# Patient Record
Sex: MALE | Race: OTHER | Employment: OTHER | URBAN - METROPOLITAN AREA
[De-identification: names, ages, dates, MRNs, and addresses within clinical notes are randomized per-mention and may not be internally consistent; named-entity substitution may affect disease eponyms.]

---

## 2017-05-17 ENCOUNTER — OFFICE VISIT (OUTPATIENT)
Dept: INTERNAL MEDICINE CLINIC | Age: 52
End: 2017-05-17

## 2017-05-17 VITALS
RESPIRATION RATE: 14 BRPM | BODY MASS INDEX: 26.82 KG/M2 | HEIGHT: 72 IN | SYSTOLIC BLOOD PRESSURE: 125 MMHG | HEART RATE: 76 BPM | DIASTOLIC BLOOD PRESSURE: 76 MMHG | TEMPERATURE: 98.3 F | WEIGHT: 198 LBS

## 2017-05-17 DIAGNOSIS — M65.331 TRIGGER MIDDLE FINGER OF RIGHT HAND: ICD-10-CM

## 2017-05-17 DIAGNOSIS — M25.562 ACUTE PAIN OF LEFT KNEE: ICD-10-CM

## 2017-05-17 DIAGNOSIS — M65.312 TRIGGER FINGER OF LEFT THUMB: Primary | ICD-10-CM

## 2017-05-17 RX ORDER — TRIAMCINOLONE ACETONIDE 40 MG/ML
40 INJECTION, SUSPENSION INTRA-ARTICULAR; INTRAMUSCULAR ONCE
Qty: 1 ML | Refills: 0
Start: 2017-05-17 | End: 2017-05-17

## 2017-05-17 NOTE — PROGRESS NOTES
Chief Complaint   Patient presents with    Head Pain     finger     he is a 54y.o. year old male who presents for evaluation of right middle finger trigger finger  Pain Assessment Encounter      Heidi Daly  5/17/2017  Onset of Symptoms: over a year  ________________________________________________________________________  Description:\"sticks\" and aches    Frequency: more than 5 times a day  Pain Scale:(1-10): 5  Trauma Hx: none  Hx of similar symptoms: Yes  Radiation: no  Duration:  continuous      Progression: is unchanged  What makes it better?: rest  What makes it worse?:using his hand  Medications tried: none    Reviewed and agree with Nurse Note and duplicated in this note. Reviewed PmHx, RxHx, FmHx, SocHx, AllgHx and updated and dated in the chart. Family History   Problem Relation Age of Onset    Hypertension Mother     Hypertension Father     Stroke Father     Diabetes Father     Elevated Lipids Father        Past Medical History:   Diagnosis Date    Diabetes ([facility] [address]     High cholesterol     Hypertension       Social History     Social History    Marital status: SINGLE     Spouse name: N/A    Number of children: N/A    Years of education: N/A     Social History Main Topics    Smoking status: Current Every Day Smoker    Smokeless tobacco: Not on file    Alcohol use No    Drug use: Not on file    Sexual activity: Not on file     Other Topics Concern    Not on file     Social History Narrative    Family from [address] [place] [address] - negative except as listed above  {ros master:129103}    Objective:     Vitals:    05/17/17 0911   BP: (!) 160/103   Pulse: 76   Resp: 14   Temp: 98.3 °F (36.8 °C)   Weight: 198 lb (89.8 kg)   Height: 6' (1.829 m)       Physical Examination: {male adult master:386835}    Assessment/ Plan:   There are no diagnoses linked to this encounter.      {plan:50992}           I have discussed the diagnosis with the patient and the intended plan as seen in the above orders. The patient has received an after-visit summary and questions were answered concerning future plans. Medication Side Effects and Warnings were discussed with patient: {yes/ no default yes:50560::\"yes\"}  Patient Labs were reviewed and or requested: {yes/ no default yes:36396::\"yes\"}  Patient Past Records were reviewed and or requested  {yes/ no default yes:15536::\"yes\"}  I have discussed the diagnosis with the patient and the intended plan as seen in the above orders. The patient has received an after-visit summary and questions were answered concerning future plans. Pt agrees to call or return to clinic and/or go to closest ER with any worsening of symptoms. This may include, but not limited to increased fever (>100.4) with NSAIDS or Tylenol, increased edema, confusion, rash, worsening of presenting symptoms. 1) Remember to stay active and/or exercise regularly (I suggest 30-45 minutes daily)   2) For reliable dietary information, go to www. EATRIGHT.org. You may wish to consider seeing the nutritionist at [facility] at #[phone] or [phone], also consider the [address]   3) I routinely suggest a complete physical exam once each year (your birth month)

## 2017-05-17 NOTE — MR AVS SNAPSHOT
Visit Information Date & Time Provider Department Dept. Phone Encounter #  
 5/17/2017 10:45 AM [address] [facility] MD Jamila [facility] and [place] [address] [id number] Follow-up Instructions Return in about 4 weeks (around 6/14/2017), or if symptoms worsen or fail to improve. Your Appointments 5/31/2017  9:00 AM  
Any with Anali Rizvi MD  
[address] [place] [address] and Primary Care [facility] Appt Note: overdue for 3 mo f/up; r/s from 5/17  
 . [address] [facility] [address] [id number] Upcoming Health Maintenance Date Due HEMOGLOBIN A1C Q6M 1967 LIPID PANEL Q1 1967 FOOT EXAM Q1 7/25/1977 MICROALBUMIN Q1 7/25/1977 EYE EXAM RETINAL OR DILATED Q1 7/25/1977 Pneumococcal 19-64 Medium Risk (1 of 1 - PPSV23) 7/25/1986 DTaP/Tdap/Td series (1 - Tdap) 7/25/1988 INFLUENZA AGE 9 TO ADULT 8/1/2017 Allergies as of 5/17/2017  Review Complete On: 5/17/2017 By: [address] [facility] MD Jamila  
 No Known Allergies Current Immunizations  Never Reviewed No immunizations on file. Not reviewed this visit You Were Diagnosed With   
  
 Codes Comments Trigger finger of left thumb    -  Primary ICD-10-CM: G22.076 ICD-9-CM: 727.03 Acute pain of left knee     ICD-10-CM: M25.562 ICD-9-CM: 719.46 Trigger middle finger of right hand     ICD-10-CM: M65.331 ICD-9-CM: 727.03 Vitals BP Pulse Temp Resp Height(growth percentile) Weight(growth percentile) 125/76 76 98.3 °F (36.8 °C) 14 6' (1.829 m) 198 lb (89.8 kg) BMI Smoking Status 26.85 kg/m2 Current Every Day Smoker Vitals History BMI and BSA Data Body Mass Index Body Surface Area  
 26.85 kg/m 2 2.14 m 2 Preferred Pharmacy Pharmacy Name Phone [facility] [address] [place] [address] [phone] Your Updated Medication List  
 This list is accurate as of: 5/17/17 11:08 AM.  Always use your most recent med list.  
  
  
  
  
 atorvastatin 20 mg tablet Commonly known as:  LIPITOR Take 1 Tab by mouth daily. ibuprofen 600 mg tablet Commonly known as:  MOTRIN Take 1 Tab by mouth every six (6) hours as needed for Pain. lisinopril 40 mg tablet Commonly known as:  Gunjan Mario Alberto Take 1 Tab by mouth daily. metFORMIN 500 mg tablet Commonly known as:  GLUCOPHAGE Take 1 Tab by mouth two (2) times daily (with meals). triamcinolone acetonide 40 mg/mL injection Commonly known as:  KENALOG  
1 mL by IntraMUSCular route once for 1 dose. We Performed the Following [place] INJECT TENDON SHEATH/LIGAMENT [id number] CPT(R)] TRIAMCINOLONE ACETONIDE INJ [ [facility]] Follow-up Instructions Return in about 4 weeks (around 6/14/2017), or if symptoms worsen or fail to improve. To-Do List   
 05/17/2017 Imaging:  XR KNEE LT MIN 4 V   
  
 05/17/2017 Imaging:  XR THUMB LT MIN 2 V Introducing [facility]ALTH SERVICES! [facility] introduces [employer] patient portal. Now you can access parts of your medical record, email your doctor's office, and request medication refills online. 1. In your internet browser, go to [email or web address] 2. Click on the First Time User? Click Here link in the Sign In box. You will see the New Member Sign Up page. 3. Enter your [employer] Access Code exactly as it appears below. You will not need to use this code after youve completed the sign-up process. If you do not sign up before the expiration date, you must request a new code. · [employer] Access Code: [email or web address] Expires: 8/15/2017  9:32 AM 
 
4. Enter the last four digits of your Social Security Number (xxxx) and Date of Birth (mm/dd/yyyy) as indicated and click Submit. You will be taken to the next sign-up page. 5. Create a [employer] ID. This will be your [employer] login ID and cannot be changed, so think of one that is secure and easy to remember. 6. Create a [employer] password. You can change your password at any time. 7. Enter your Password Reset Question and Answer. This can be used at a later time if you forget your password. 8. Enter your e-mail address. You will receive e-mail notification when new information is available in [address] 9. Click Sign Up. You can now view and download portions of your medical record. 10. Click the Download Summary menu link to download a portable copy of your medical information. If you have questions, please visit the Frequently Asked Questions section of the [employer] website. Remember, [employer] is NOT to be used for urgent needs. For medical emergencies, dial 911. Now available from your iPhone and Android! Please provide this summary of care documentation to your next provider. Your primary care clinician is listed as Zehra Christianson. If you have any questions after today's visit, please call [phone].

## 2017-05-17 NOTE — PROGRESS NOTES
Chief Complaint   Patient presents with    Head Pain     finger     he is a 54y.o. year old male who presents for evaluation of right middle finger trigger finger  Pain Assessment Encounter      Christopher Montiel  5/17/2017  Onset of Symptoms: over a year  ________________________________________________________________________  Description:\"sticks\" and aches    Frequency: more than 5 times a day  Pain Scale:(1-10): 5  Trauma Hx: none  Hx of similar symptoms: Yes  Radiation: no  Duration:  continuous      Progression: is unchanged  What makes it better?: rest  What makes it worse?:using his hand  Medications tried: none    Reviewed and agree with Nurse Note and duplicated in this note. Reviewed PmHx, RxHx, FmHx, SocHx, AllgHx and updated and dated in the chart.     Family History   Problem Relation Age of Onset    Hypertension Mother     Hypertension Father     Stroke Father     Diabetes Father     Elevated Lipids Father        Past Medical History:   Diagnosis Date    Diabetes ([facility] [address]     High cholesterol     Hypertension       Social History     Social History    Marital status: SINGLE     Spouse name: N/A    Number of children: N/A    Years of education: N/A     Social History Main Topics    Smoking status: Current Every Day Smoker    Smokeless tobacco: Not on file    Alcohol use No    Drug use: Not on file    Sexual activity: Not on file     Other Topics Concern    Not on file     Social History Narrative    Family from [address] [place] [address] - negative except as listed above      Objective:     Vitals:    05/17/17 0911   BP: 125/76   Pulse: 76   Resp: 14   Temp: 98.3 °F (36.8 °C)   Weight: 198 lb (89.8 kg)   Height: 6' (1.829 m)       Physical Examination: General appearance - alert, well appearing, and in no distress  Back exam - full range of motion, no tenderness, palpable spasm or pain on motion  Neurological - alert, oriented, normal speech, no focal findings or movement disorder noted  Musculoskeletal -  right finger 3rd MCP - clicking and locking with movement, left thumb pain with palpation over flexor tendon with locking  left knee exam:  The patient'sleft Knee  is  normal to inspection. The ROM is normal and there is flexion to 60 Effusion is: absent The joint exhibits  absent warmth and Crepitus is: absent. The Jayme test is:  Negative Joint Line Tenderness is  Negative . The [facility] Test is Negative   and the Lachman is  negative   . The Anterior Drawer is  Negative. The Posterior Drawer is:   negative. Valgus Stress (for MCL) is:  normal . Varus Stress (for LCL) is  normal  . The Roderick Test is negative and the Apprehension Sign:   negative    Extremities - peripheral pulses normal, no pedal edema, no clubbing or cyanosis  Skin - normal coloration and turgor, no rashes, no suspicious skin lesions noted  Time Out taken at:  9:58 AM  5/17/2017    * Patient was identified by name and date of birth   * Agreement on procedure being performed was verified  * Risks and Benefits explained to the patient  * Procedure site verified and marked as necessary  * Patient was positioned for comfort  * Consent was signed and verified   In the presence of: Witness: JORDI Jacobs  Injection #: 1  Needle:  22 gauge  Procedure: This procedure was discussed with Estephanie Ruffin and other therapeutic options were considered (risks vs benefits). Estephanie Ruffin and I thought that an injection was merited. After informed consent was obtained, landmarks were identified(marked), and the right trigger finger - 3rd  was cleansed with ChlorPrep in the standard sterile manner. .5 mL  1% lidocaine  and  .5 mL Kenalog  was then injected and needle tenotomy was not performed. Procedure performed with ultrasound needle guidance. The needle was then withdrawn. T he procedure was well tolerated. The patient is asked to continue to rest the area for a few more days before resuming regular activities. It may be more painful for the first 1-2 days. NSAIDS are to be avoided. Watch for fever, or increased swelling or persistent pain in the joint. Call or return to clinic prn if such symptoms occur or there is failure to improve as anticipated. The procedure did provide relief of symptoms in the clinic. RTC in 4 weeks for reevaluation and possible reinjection. Given the patient's body habitus and the anatomically deep nature of this structure, sonographic guidance is recommended to prevent injury to neurovascular structures and confirm accuracy of injection. Furthermore, this patient has failed conservative treatment with physical therapy and modalities and the diagnostic and therapeutic accuracy is important. Assessment/ Plan:   Stella Sandoval was seen today for head pain. Diagnoses and all orders for this visit:    Trigger finger of left thumb  -     XR THUMB LT MIN 2 V; Future  -     TRIAMCINOLONE ACETONIDE INJ  -     triamcinolone acetonide (KENALOG) 40 mg/mL injection; 1 mL by IntraMUSCular route once for 1 dose. -     20550 - INJECT TENDON SHEATH/LIGAMENT    Acute pain of left knee  -     XR KNEE LT MIN 4 V; Future    Trigger middle finger of right hand         Pathophysiology, recovery and rehabilitation process discussed and questions answered   Counseling for 30 Minutes of the total visit duration   Pictures and figures used as necessary   Provided reassurance   Monitor response to Physical Therapy   Recommend  lower impact activities-walking, Eliptical, Nordic Track, cycling or swimming   Follow up in 4 week(s)           I have discussed the diagnosis with the patient and the intended plan as seen in the above orders. The patient has received an after-visit summary and questions were answered concerning future plans.      Medication Side Effects and Warnings were discussed with patient: yes  Patient Labs were reviewed and or requested: yes  Patient Past Records were reviewed and or requested yes  I have discussed the diagnosis with the patient and the intended plan as seen in the above orders. The patient has received an after-visit summary and questions were answered concerning future plans. Pt agrees to call or return to clinic and/or go to closest ER with any worsening of symptoms. This may include, but not limited to increased fever (>100.4) with NSAIDS or Tylenol, increased edema, confusion, rash, worsening of presenting symptoms. 1) Remember to stay active and/or exercise regularly (I suggest 30-45 minutes daily)   2) For reliable dietary information, go to www. EATRIGHT.org. You may wish to consider seeing the nutritionist at [facility] at #[phone] or [phone], also consider the [address] [facility].   3) I routinely suggest a complete physical exam once each year (your birth month)

## 2017-05-26 ENCOUNTER — APPOINTMENT (OUTPATIENT)
Dept: PHYSICAL THERAPY | Age: 52
End: 2017-05-26

## 2017-08-15 RX ORDER — METFORMIN HYDROCHLORIDE 500 MG/1
500 TABLET ORAL 2 TIMES DAILY WITH MEALS
Qty: 180 TAB | Refills: 3 | Status: SHIPPED | OUTPATIENT
Start: 2017-08-15 | End: 2017-08-17 | Stop reason: SDUPTHER

## 2017-08-17 ENCOUNTER — OFFICE VISIT (OUTPATIENT)
Dept: INTERNAL MEDICINE CLINIC | Age: 52
End: 2017-08-17

## 2017-08-17 VITALS
SYSTOLIC BLOOD PRESSURE: 151 MMHG | HEIGHT: 72 IN | DIASTOLIC BLOOD PRESSURE: 88 MMHG | BODY MASS INDEX: 27.37 KG/M2 | OXYGEN SATURATION: 96 % | HEART RATE: 73 BPM | WEIGHT: 202.1 LBS | RESPIRATION RATE: 18 BRPM | TEMPERATURE: 98.2 F

## 2017-08-17 DIAGNOSIS — I10 ESSENTIAL HYPERTENSION: ICD-10-CM

## 2017-08-17 DIAGNOSIS — B35.3 TINEA PEDIS OF BOTH FEET: ICD-10-CM

## 2017-08-17 DIAGNOSIS — Z12.11 SCREEN FOR COLON CANCER: ICD-10-CM

## 2017-08-17 DIAGNOSIS — E11.8 TYPE 2 DIABETES MELLITUS WITH COMPLICATION, WITHOUT LONG-TERM CURRENT USE OF INSULIN (HCC): Primary | ICD-10-CM

## 2017-08-17 DIAGNOSIS — M21.611 BUNION OF GREAT TOE OF RIGHT FOOT: ICD-10-CM

## 2017-08-17 DIAGNOSIS — E78.5 HYPERLIPIDEMIA, UNSPECIFIED HYPERLIPIDEMIA TYPE: ICD-10-CM

## 2017-08-17 RX ORDER — LISINOPRIL 40 MG/1
40 TABLET ORAL DAILY
Qty: 90 TAB | Refills: 4 | Status: SHIPPED | OUTPATIENT
Start: 2017-08-17 | End: 2021-06-30 | Stop reason: SDUPTHER

## 2017-08-17 RX ORDER — METFORMIN HYDROCHLORIDE 500 MG/1
500 TABLET ORAL 2 TIMES DAILY WITH MEALS
Qty: 180 TAB | Refills: 3 | Status: SHIPPED | OUTPATIENT
Start: 2017-08-17 | End: 2021-06-30 | Stop reason: SDUPTHER

## 2017-08-17 RX ORDER — ATORVASTATIN CALCIUM 20 MG/1
TABLET, FILM COATED ORAL
Qty: 90 TAB | Refills: 4 | Status: SHIPPED | OUTPATIENT
Start: 2017-08-17 | End: 2021-06-30 | Stop reason: SDUPTHER

## 2017-08-17 RX ORDER — ECONAZOLE NITRATE 10 MG/G
CREAM TOPICAL 2 TIMES DAILY
Qty: 85 G | Refills: 4 | Status: SHIPPED | OUTPATIENT
Start: 2017-08-17 | End: 2021-06-30 | Stop reason: ALTCHOICE

## 2017-08-17 NOTE — MR AVS SNAPSHOT
Visit Information Date & Time Provider Department Dept. Phone Encounter #  
 8/17/2017  9:30 AM Shar Krishna MD [facility] Sports Medicine and [place] [address] [id number] Follow-up Instructions Return in about 6 months (around 2/17/2018) for Blood Pressure, Diabetes. Follow-up and Disposition History Your Appointments 8/18/2017  9:30 AM  
Any with [address] [place] [address] MD Jamila  
[address] [place] [address] and Primary Care [address] [place] [address]) Appt Note: left thumb pain [address]n [address] [facility] [place]  
  
   
 [address] [address] Upcoming Health Maintenance Date Due HEMOGLOBIN A1C Q6M 1965 LIPID PANEL Q1 1967 FOOT EXAM Q1 7/25/1977 MICROALBUMIN Q1 7/25/1977 EYE EXAM RETINAL OR DILATED Q1 7/25/1977 Pneumococcal 19-64 Medium Risk (1 of 1 - PPSV23) 7/25/1986 DTaP/Tdap/Td series (1 - Tdap) 7/25/1988 FOBT Q 1 YEAR AGE 50-75 7/25/2017 INFLUENZA AGE 9 TO ADULT 8/1/2017 Allergies as of 8/17/2017  Review Complete On: 8/17/2017 By: Travis Wheeler No Known Allergies Current Immunizations  Never Reviewed No immunizations on file. Not reviewed this visit You Were Diagnosed With   
  
 Codes Comments Type 2 diabetes mellitus with complication, without long-term current use of insulin (HCC)    -  Primary ICD-10-CM: E11.8 ICD-9-CM: 250.90 Hyperlipidemia, unspecified hyperlipidemia type     ICD-10-CM: E78.5 ICD-9-CM: 272.4 Essential hypertension     ICD-10-CM: I10 
ICD-9-CM: 401.9 Tinea pedis of both feet     ICD-10-CM: B35.3 ICD-9-CM: 110.4 Bunion of great toe of right foot     ICD-10-CM: M21.611 ICD-9-CM: 727.1 Screen for colon cancer     ICD-10-CM: Z12.11 ICD-9-CM: V76.51 Vitals BP Pulse Temp Resp Height(growth percentile) Weight(growth percentile)  151/88 (BP 1 Location: Left arm, BP Patient Position: Sitting) 73 98.2 °F (36.8 °C) (Oral) 18 6' (1.829 m) 202 lb 1.6 oz (91.7 kg) SpO2 BMI Smoking Status 96% 27.41 kg/m2 Current Every Day Smoker BMI and BSA Data Body Mass Index Body Surface Area  
 27.41 kg/m 2 2.16 m 2 [facility] [place] [facility] [phone] Your Updated Medication List  
  
   
This list is accurate as of: 8/17/17  2:44 PM.  Always use your most recent med list.  
  
  
  
  
 atorvastatin 20 mg tablet Commonly known as:  LIPITOR  
take 1 tablet by mouth once daily  
  
 econazole nitrate 1 % topical cream  
Commonly known as:  Glenis Escaleraten Apply  to affected area two (2) times a day. Entire foot and between toes  
  
 ibuprofen 600 mg tablet Commonly known as:  MOTRIN Take 1 Tab by mouth every six (6) hours as needed for Pain. lisinopril 40 mg tablet Commonly known as:  Bobby Littler Take 1 Tab by mouth daily. Overdue for visit. Make appointment in 1-2 weeks. metFORMIN 500 mg tablet Commonly known as:  GLUCOPHAGE Take 1 Tab by mouth two (2) times daily (with meals). Prescriptions Sent to Pharmacy Refills  
 metFORMIN (GLUCOPHAGE) 500 mg tablet 3 Sig: Take 1 Tab by mouth two (2) times daily (with meals). Class: Normal  
 Pharmacy: [address] Ph #: [phone] Route: Oral  
 atorvastatin (LIPITOR) 20 mg tablet 4 Sig: take 1 tablet by mouth once daily Class: Normal  
 Pharmacy: [facility] Ph #: [phone]  
 lisinopril (PRINIVIL, ZESTRIL) 40 mg tablet 4 Sig: Take 1 Tab by mouth daily. Overdue for visit. Make appointment in 1-2 weeks. Class: Normal  
 Pharmacy: [address] Ph #: [phone]  Route: Oral  
 econazole nitrate (SPECTAZOLE) 1 % topical cream 4  
 Sig: Apply  to affected area two (2) times a day. Entire foot and between toes Class: Normal  
 Pharmacy: Madhav , [address] Ph #: [phone] Route: Topical  
  
We Performed the Following HEMOGLOBIN A1C WITH EAG [57176 CPT(R)]  DIABETES FOOT EXAM [HM7 Custom] LIPID PANEL [69815 CPT(R)] METABOLIC PANEL, BASIC [53190 CPT(R)] MICROALBUMIN, UR, RAND W/ MICROALBUMIN/CREA RATIO G7260059 CPT(R)] CA COLLECTION VENOUS BLOOD,VENIPUNCTURE [id number] CPT(R)] REFERRAL FOR COLONOSCOPY [GAP912 Custom] Comments:  
 Please evaluate patient for screening colonoscopy. REFERRAL TO OPHTHALMOLOGY [REF57 Custom] Comments:  
 Please evaluate patient for DM eye exam, prescription update [employer] Follow-up Instructions Return in about 6 months (around 2/17/2018) for Blood Pressure, Diabetes. Referral Information Referral ID Referred By Referred To  
  
 [id number] [place] [employer] [address] [facility], [address] Visits Status Start Date End Date 1 Authorized 8/17/17 8/17/18 If your referral has a status of pending review or denied, additional information will be sent to support the outcome of this decision. Referral ID Referred By Referred To  
 [id number] [employer] [facility] [place] [address] Phone: [phone] Fax: [phone] Visits Status Start Date End Date 1 Authorized 8/17/17 8/17/18 If your referral has a status of pending review or denied, additional information will be sent to support the outcome of this decision. Introducing \[facility]\"" & HEALTH SERVICES! Magalys Martin introduces [employer] patient portal. Now you can access parts of your medical record, email your doctor's office, and request medication refills online.    
 
1. In your internet browser, go to [email or web address] 2. Click on the First Time User? Click Here link in the Sign In box. You will see the New Member Sign Up page. 3. Enter your [employer] Access Code exactly as it appears below. You will not need to use this code after youve completed the sign-up process. If you do not sign up before the expiration date, you must request a new code. · [employer] Access Code: [email or web address] Expires: 11/15/2017  2:44 PM 
 
4. Enter the last four digits of your Social Security Number (xxxx) and Date of Birth (mm/dd/yyyy) as indicated and click Submit. You will be taken to the next sign-up page. 5. Create a [employer] Access [employer]t ID. This will be your [employer] login ID and cannot be changed, so think of one that is secure and easy to remember. 6. Create a [employer] password. You can change your password at any time. 7. Enter your Password Reset Question and Answer. This can be used at a later time if you forget your password. 8. Enter your e-mail address. You will receive e-mail notification when new information is available in [address] 9. Click Sign Up. You can now view and download portions of your medical record. 10. Click the Download Summary menu link to download a portable copy of your medical information. If you have questions, please visit the Frequently Asked Questions section of the [employer] website. Remember, [employer] is NOT to be used for urgent needs. For medical emergencies, dial 911. Now available from your iPhone and Android! Please provide this summary of care documentation to your next provider. Your primary care clinician is listed as Jose F Fong. If you have any questions after today's visit, please call [phone].

## 2017-08-17 NOTE — PROGRESS NOTES
Mr. Emmie June is a 50y.o. year old male who had concerns including Diabetes; Hypertension; and Medication Refill. HPI:  Chief Complaint   Patient presents with    Diabetes     Foot Exam- Rt Great Toe    Hypertension    Medication Refill     Out of Meds X 1 Mo. Non compliant with diet- high rice intake, [place]. No checking BS  Past Medical History:   Diagnosis Date    Diabetes ([address]     High cholesterol     Hypertension      Current Outpatient Prescriptions   Medication Sig Dispense    metFORMIN (GLUCOPHAGE) 500 mg tablet Take 1 Tab by mouth two (2) times daily (with meals). 180 Tab    atorvastatin (LIPITOR) 20 mg tablet take 1 tablet by mouth once daily 90 Tab    lisinopril (PRINIVIL, ZESTRIL) 40 mg tablet Take 1 Tab by mouth daily. Overdue for visit. Make appointment in 1-2 weeks. 90 Tab    econazole nitrate (SPECTAZOLE) 1 % topical cream Apply  to affected area two (2) times a day. Entire foot and between toes 85 g    ibuprofen (MOTRIN) 600 mg tablet Take 1 Tab by mouth every six (6) hours as needed for Pain. 20 Tab     No current facility-administered medications for this visit. Reviewed PmHx, RxHx, FmHx, SocHx, AllgHx and updated and dated in the chart. ROS: Negative except for BOLD  General: fever, chills, fatigue  Respiratory: cough, SOB, wheezing  Cardiovascular:  CP, palpitation, YO, edema   Gastrointestinal: N/V/D, bleeding  Genito-Urinary: dysuria, hematuria  Musculoskeletal: muscle weakness, pain, swelling    OBJECTIVE:   Visit Vitals    /88 (BP 1 Location: Left arm, BP Patient Position: Sitting)    Pulse 73    Temp 98.2 °F (36.8 °C) (Oral)    Resp 18    Ht 6' (1.829 m)    Wt 202 lb 1.6 oz (91.7 kg)    SpO2 96%    BMI 27.41 kg/m2     GEN: The patient appears well, alert, oriented x 3, in no distress. ENT: bilateral TM and canal normal.  Neck supple. No adenopathy or thyromegaly. VICENTA.    Lungs: clear bilaterally, good air entry, no wheezes, rhonchi or rales. Cardiovascular: regular rate and rhythm. S1 and S2 normal, no murmurs,  Abdomen: + BS, soft without tenderness, guarding, rebound, mass or organomegaly. Extremities: no edema, normal peripheral pulses. Neurological: normal, gross sensory and motor in tact without focal findings. Bilateral foot scaling,maceration. Tender bunion on right great toe. Assessment/ Plan:       ICD-10-CM ICD-9-CM    1. Type 2 diabetes mellitus with complication, without long-term current use of insulin (HCC) E11.8 250.90 HEMOGLOBIN A1C WITH EAG      METABOLIC PANEL, BASIC      MICROALBUMIN, UR, RAND W/ MICROALBUMIN/CREA RATIO      MD COLLECTION VENOUS BLOOD,VENIPUNCTURE      LIPID PANEL      HM DIABETES FOOT EXAM      metFORMIN (GLUCOPHAGE) 500 mg tablet      atorvastatin (LIPITOR) 20 mg tablet      lisinopril (PRINIVIL, ZESTRIL) 40 mg tablet   2. Hyperlipidemia, unspecified hyperlipidemia type E78.5 272.4 atorvastatin (LIPITOR) 20 mg tablet   3. Essential hypertension I10 401.9 lisinopril (PRINIVIL, ZESTRIL) 40 mg tablet   4. Tinea pedis of both feet B35.3 110.4 econazole nitrate (SPECTAZOLE) 1 % topical cream   5. Bunion of great toe of right foot M21.611 727.1        Pt out of medication x 1 month. Refills and labs today. No prior A1c for comparison, pt not checking blood sugars. I have discussed the diagnosis with the patient and the intended plan as seen in the above orders. The patient has received an after-visit summary and questions were answered concerning future plans. Medication Side Effects and Warnings were discussed with patient. Follow-up Disposition:  Return in about 6 months (around 2/17/2018) for Blood Pressure, Diabetes.       Cheryl Jackson MD

## 2017-08-17 NOTE — PROGRESS NOTES
Chief Complaint   Patient presents with    Diabetes     Foot Exam- Rt Great Toe    Hypertension    Medication Refill     Out of Meds X 1 Mo. Declines Immunizations.

## 2017-08-18 ENCOUNTER — OFFICE VISIT (OUTPATIENT)
Dept: INTERNAL MEDICINE CLINIC | Age: 52
End: 2017-08-18

## 2017-08-18 VITALS
WEIGHT: 202 LBS | SYSTOLIC BLOOD PRESSURE: 164 MMHG | RESPIRATION RATE: 16 BRPM | DIASTOLIC BLOOD PRESSURE: 110 MMHG | BODY MASS INDEX: 27.36 KG/M2 | HEIGHT: 72 IN | HEART RATE: 68 BPM

## 2017-08-18 DIAGNOSIS — M65.312 TRIGGER THUMB, LEFT THUMB: Primary | ICD-10-CM

## 2017-08-18 LAB
ALBUMIN/CREAT UR: 9.6 MG/G CREAT (ref 0–30)
BUN SERPL-MCNC: 9 MG/DL (ref 6–24)
BUN/CREAT SERPL: 12 (ref 9–20)
CALCIUM SERPL-MCNC: 9.6 MG/DL (ref 8.7–10.2)
CHLORIDE SERPL-SCNC: 99 MMOL/L (ref 96–106)
CHOLEST SERPL-MCNC: 269 MG/DL (ref 100–199)
CO2 SERPL-SCNC: 22 MMOL/L (ref 18–29)
CREAT SERPL-MCNC: 0.77 MG/DL (ref 0.76–1.27)
CREAT UR-MCNC: 203.3 MG/DL
EST. AVERAGE GLUCOSE BLD GHB EST-MCNC: 148 MG/DL
GLUCOSE SERPL-MCNC: 136 MG/DL (ref 65–99)
HBA1C MFR BLD: 6.8 % (ref 4.8–5.6)
HDLC SERPL-MCNC: 87 MG/DL
LDLC SERPL CALC-MCNC: 161 MG/DL (ref 0–99)
MICROALBUMIN UR-MCNC: 19.6 UG/ML
POTASSIUM SERPL-SCNC: 5 MMOL/L (ref 3.5–5.2)
SODIUM SERPL-SCNC: 140 MMOL/L (ref 134–144)
TRIGL SERPL-MCNC: 104 MG/DL (ref 0–149)
VLDLC SERPL CALC-MCNC: 21 MG/DL (ref 5–40)

## 2017-08-18 RX ORDER — TRIAMCINOLONE ACETONIDE 40 MG/ML
40 INJECTION, SUSPENSION INTRA-ARTICULAR; INTRAMUSCULAR ONCE
Qty: 1 ML | Refills: 0
Start: 2017-08-18 | End: 2017-08-18

## 2017-08-18 NOTE — PATIENT INSTRUCTIONS
The patient is asked to continue to rest the area for a few more days before resuming regular activities. It may be more painful for the first 1-2 days. NSAIDS are to be avoided. Watch for fever, or increased swelling or persistent pain in the joint. Call or return to clinic prn if such symptoms occur or there is failure to improve as anticipated.

## 2017-08-18 NOTE — MR AVS SNAPSHOT
Visit Information Date & Time Provider Department Dept. Phone Encounter #  
 8/18/2017  9:30 AM Reynold Foreman MD [facility] [id number] Follow-up Instructions Return if symptoms worsen or fail to improve. Follow-up and Disposition History Upcoming Health Maintenance Date Due HEMOGLOBIN A1C Q6M 1967 LIPID PANEL Q1 1967 MICROALBUMIN Q1 7/25/1977 EYE EXAM RETINAL OR DILATED Q1 7/25/1977 Pneumococcal 19-64 Medium Risk (1 of 1 - PPSV23) 7/25/1986 DTaP/Tdap/Td series (1 - Tdap) 7/25/1988 FOBT Q 1 YEAR AGE 50-75 7/25/2017 INFLUENZA AGE 9 TO ADULT 8/1/2017 FOOT EXAM Q1 8/17/2018 Allergies as of 8/18/2017  Review Complete On: 8/18/2017 By: Reynold Foreman MD  
 No Known Allergies Current Immunizations  Never Reviewed No immunizations on file. Not reviewed this visit You Were Diagnosed With   
  
 Codes Comments Trigger thumb, left thumb    -  Primary ICD-10-CM: X03.610 ICD-9-CM: 727.03 Vitals BP Pulse Resp Height(growth percentile) Weight(growth percentile) BMI  
 (!) 164/110 68 16 6' (1.829 m) 202 lb (91.6 kg) 27.4 kg/m2 Smoking Status Current Every Day Smoker Vitals History BMI and BSA Data Body Mass Index Body Surface Area  
 27.4 kg/m 2 2.16 m 2 Preferred Pharmacy Pharmacy Name Phone [address] [facility] [phone] Your Updated Medication List  
  
   
This list is accurate as of: 8/18/17  1:32 PM.  Always use your most recent med list.  
  
  
  
  
 atorvastatin 20 mg tablet Commonly known as:  LIPITOR  
take 1 tablet by mouth once daily  
  
 econazole nitrate 1 % topical cream  
Commonly known as:  Edilia Agent Apply  to affected area two (2) times a day. Entire foot and between toes  
  
 ibuprofen 600 mg tablet Commonly known as:  MOTRIN  
 Take 1 Tab by mouth every six (6) hours as needed for Pain. lisinopril 40 mg tablet Commonly known as:  Guadalupe Naif Take 1 Tab by mouth daily. Overdue for visit. Make appointment in 1-2 weeks. metFORMIN 500 mg tablet Commonly known as:  GLUCOPHAGE Take 1 Tab by mouth two (2) times daily (with meals). triamcinolone acetonide 40 mg/mL injection Commonly known as:  KENALOG  
1 mL by IntraMUSCular route once for 1 dose. We Performed the Following HI INJECT TENDON SHEATH/LIGAMENT [id number] CPT(R)] TRIAMCINOLONE ACETONIDE INJ [ [facility]] Follow-up Instructions Return if symptoms worsen or fail to improve. Patient Instructions The patient is asked to continue to rest the area for a few more days before resuming regular activities. It may be more painful for the first 1-2 days. NSAIDS are to be avoided. Watch for fever, or increased swelling or persistent pain in the joint. Call or return to clinic prn if such symptoms occur or there is failure to improve as anticipated. Introducing \[facility]\"" & HEALTH SERVICES! Almas Maza introduces [employer] patient portal. Now you can access parts of your medical record, email your doctor's office, and request medication refills online. 1. In your internet browser, go to [email or web address] 2. Click on the First Time User? Click Here link in the Sign In box. You will see the New Member Sign Up page. 3. Enter your [employer] Access Code exactly as it appears below. You will not need to use this code after youve completed the sign-up process. If you do not sign up before the expiration date, you must request a new code. · [employer] Access Code: [email or web address] Expires: 11/15/2017  2:44 PM 
 
4. Enter the last four digits of your Social Security Number (xxxx) and Date of Birth (mm/dd/yyyy) as indicated and click Submit. You will be taken to the next sign-up page. 5. Create a [employer] ID. This will be your [employer] login ID and cannot be changed, so think of one that is secure and easy to remember. 6. Create a [employer] password. You can change your password at any time. 7. Enter your Password Reset Question and Answer. This can be used at a later time if you forget your password. 8. Enter your e-mail address. You will receive e-mail notification when new information is available in [address] 9. Click Sign Up. You can now view and download portions of your medical record. 10. Click the Download Summary menu link to download a portable copy of your medical information. If you have questions, please visit the Frequently Asked Questions section of the [employer] website. Remember, [employer] is NOT to be used for urgent needs. For medical emergencies, dial 911. Now available from your iPhone and Android! Please provide this summary of care documentation to your next provider. Your primary care clinician is listed as [employer] If you have any questions after today's visit, please call [phone].

## 2017-08-18 NOTE — PROGRESS NOTES
Chief Complaint   Patient presents with    Finger Pain     Left Hand Thumb Pain      he is a 50y.o. year old male who presents for evaluation of Left hand thumb pain   Pain Assessment Encounter      Neo Olmstead  8/18/2017  Onset of Symptoms: May 2017   ________________________________________________________________________  Description: \"just continuous pain\"    Frequency: more than 5 times a day, worst at night  Pain Scale:(1-10): 7  Trauma Hx: shut in the car door  Hx of similar symptoms: No  Radiation: No Radiation   Duration:  continuous      Progression: has moderately improved  What makes it better?: None   What makes it worse?:None   Medications tried: No OTC Medication Use     Reviewed and agree with Nurse Note and duplicated in this note. Reviewed PmHx, RxHx, FmHx, SocHx, AllgHx and updated and dated in the chart.     Family History   Problem Relation Age of Onset    Hypertension Mother     Hypertension Father     Stroke Father     Diabetes Father     Elevated Lipids Father        Past Medical History:   Diagnosis Date    [address] [address]     High cholesterol     Hypertension       Social History     Social History    Marital status: SINGLE     Spouse name: N/A    Number of children: N/A    Years of education: N/A     Social History Main Topics    Smoking status: Current Every Day Smoker    Smokeless tobacco: Not on file    Alcohol use No    Drug use: Not on file    Sexual activity: Not on file     Other Topics Concern    Not on file     Social History Narrative    Family from [address] [place] [address] - negative except as listed above      Objective:     Vitals:    08/18/17 1010   BP: (!) 164/110   Pulse: 68   Resp: 16   Weight: 202 lb (91.6 kg)   Height: 6' (1.829 m)       Physical Examination: General appearance - alert, well appearing, and in no distress  Back exam - full range of motion, no tenderness, palpable spasm or pain on motion  Neurological - alert, oriented, normal speech, no focal findings or movement disorder noted  Musculoskeletal - left thumb -positive pain with palpation of thumb abductor, slight swelling noted with pain with palpation, no ecchymosis or deformity noted, + trigger  Extremities - peripheral pulses normal, no pedal edema, no clubbing or cyanosis  Skin - normal coloration and turgor, no rashes, no suspicious skin lesions noted  Time Out taken at:  10:26 AM  8/18/2017    * Patient was identified by name and date of birth   * Agreement on procedure being performed was verified  * Risks and Benefits explained to the patient  * Procedure site verified and marked as necessary  * Patient was positioned for comfort  * Consent was signed and verified   In the presence of: Witness: Crystal  Injection #: 1  Needle:  25 gauge  Procedure: This procedure was discussed with Kong Boggs and other therapeutic options were considered (risks vs benefits). Kong Boggs and I thought that an injection was merited. After informed consent was obtained, landmarks were identified(marked), and the left trigger finger was cleansed with ChlorPrep in the standard sterile manner. .5 mL  1% lidocaine  and  .5 mL Kenalog  was then injected and needle tenotomy was not performed. Procedure performed with ultrasound needle guidance. The needle was then withdrawn. T he procedure was well tolerated. The patient is asked to continue to rest the area for a few more days before resuming regular activities. It may be more painful for the first 1-2 days. NSAIDS are to be avoided. Watch for fever, or increased swelling or persistent pain in the joint. Call or return to clinic prn if such symptoms occur or there is failure to improve as anticipated. The procedure did provide relief of symptoms in the clinic. RTC in 4 weeks for reevaluation and possible reinjection.      Given the patient's body habitus and the anatomically deep nature of this structure, sonographic guidance is recommended to prevent injury to neurovascular structures and confirm accuracy of injection. Furthermore, this patient has failed conservative treatment with physical therapy and modalities and the diagnostic and therapeutic accuracy is important. Assessment/ Plan:   Diagnoses and all orders for this visit:    1. Trigger thumb of right hand  -     TRIAMCINOLONE ACETONIDE INJ  -     triamcinolone acetonide (KENALOG) 40 mg/mL injection; 1 mL by IntraMUSCular route once for 1 dose. -     83924 - INJECT TENDON SHEATH/LIGAMENT       Pathophysiology, recovery and rehabilitation process discussed and questions answered   Counseling for 30 Minutes of the total visit duration   Pictures and figures used as necessary   Provided reassurance   Monitor response to injection   Discussed steroid side effects of fat atrophy, hypopigmentation, steroid flare or infection   Recommend activity modification   Recommend  lower impact activities-walking, Eliptical, Nordic Track, cycling or swimming            I have discussed the diagnosis with the patient and the intended plan as seen in the above orders. The patient has received an after-visit summary and questions were answered concerning future plans. Medication Side Effects and Warnings were discussed with patient: yes  Patient Labs were reviewed and or requested: yes  Patient Past Records were reviewed and or requested  yes  I have discussed the diagnosis with the patient and the intended plan as seen in the above orders. The patient has received an after-visit summary and questions were answered concerning future plans. Pt agrees to call or return to clinic and/or go to closest ER with any worsening of symptoms. This may include, but not limited to increased fever (>100.4) with NSAIDS or Tylenol, increased edema, confusion, rash, worsening of presenting symptoms.       1) Remember to stay active and/or exercise regularly (I suggest 30-45 minutes daily) 2) For reliable dietary information, go to www. EATRIGHT.org. You may wish to consider seeing the nutritionist at [facility] at #[phone] or [phone], also consider the [address]   3) I routinely suggest a complete physical exam once each year (your birth month)

## 2017-09-06 ENCOUNTER — TELEPHONE (OUTPATIENT)
Dept: INTERNAL MEDICINE CLINIC | Age: 52
End: 2017-09-06

## 2020-09-28 ENCOUNTER — OFFICE VISIT (OUTPATIENT)
Dept: INTERNAL MEDICINE CLINIC | Age: 55
End: 2020-09-28
Payer: COMMERCIAL

## 2020-09-28 VITALS
TEMPERATURE: 98.4 F | WEIGHT: 192.9 LBS | SYSTOLIC BLOOD PRESSURE: 138 MMHG | BODY MASS INDEX: 26.13 KG/M2 | DIASTOLIC BLOOD PRESSURE: 80 MMHG | HEART RATE: 81 BPM | HEIGHT: 72 IN | RESPIRATION RATE: 14 BRPM | OXYGEN SATURATION: 95 %

## 2020-09-28 DIAGNOSIS — M77.11 RIGHT LATERAL EPICONDYLITIS: Primary | ICD-10-CM

## 2020-09-28 PROCEDURE — 20551 NJX 1 TENDON ORIGIN/INSJ: CPT | Performed by: FAMILY MEDICINE

## 2020-09-28 PROCEDURE — 99214 OFFICE O/P EST MOD 30 MIN: CPT | Performed by: FAMILY MEDICINE

## 2020-09-28 RX ORDER — TRIAMCINOLONE ACETONIDE 40 MG/ML
40 INJECTION, SUSPENSION INTRA-ARTICULAR; INTRAMUSCULAR ONCE
Qty: 1 ML | Refills: 0
Start: 2020-09-28 | End: 2020-09-28

## 2020-09-28 NOTE — PATIENT INSTRUCTIONS
If you have had an injection today, continue to rest the area for a few more days before resuming regular activities. It may be more painful for the first 1-2 days. NSAIDS are to be avoided. Watch for fever, or increased swelling or persistent pain in the joint. Call or return to clinic prn if such symptoms occur or there is failure to improve as anticipated. 1) Remember to stay active and/or exercise regularly (I suggest 30-45 minutes daily)   2) For reliable dietary information, go to www. EATRIGHT.org. You may wish to consider seeing the nutritionists:  Jairo Casarez @ [facility] or [address] - [phone] / [phone]  Or:  Romi Martell @ [facility] or [address] [place] [address] [phone],    - also consider the Mediterranean diet and DASH diets  3) I routinely suggest a complete physical exam once each year (your birth month)

## 2020-09-28 NOTE — PROGRESS NOTES
No chief complaint on file. he is a 50y.o. year old male who presents for evaluation of right elbow  Pain Assessment Encounter      Mahsa Kennedy  9/28/2020  Onset of Symptoms: 1 month  ________________________________________________________________________  Description:pain in elbow after stitches removal    Frequency: once a day  Pain Scale:(1-10): 8  Trauma Hx: no trauma  Hx of similar symptoms: No:   Radiation: NO, elbow  Duration:continuous       Progression: is unchanged  What makes it better?: nothing  What makes it worse?:exercise  Medications tried: acetaminophen, ibuprofen    Reviewed and agree with Nurse Note and duplicated in this note. Reviewed PmHx, RxHx, FmHx, SocHx, AllgHx and updated and dated in the chart.     Family History   Problem Relation Age of Onset    Hypertension Mother     Hypertension Father     Stroke Father     Diabetes Father     Elevated Lipids Father        Past Medical History:   Diagnosis Date    Diabetes [address] [address]    Vikas H/O colonoscopy with polypectomy 08/31/2017    Dr Neymar Slaughter High cholesterol     Hypertension       Social History     Socioeconomic History    Marital status: SINGLE     Spouse name: Not on file    Number of children: Not on file    Years of education: Not on file    Highest education level: Not on file   Tobacco Use    Smoking status: Current Every Day Smoker    Smokeless tobacco: Never Used   Substance and Sexual Activity    Alcohol use: No   Social History Narrative    Family from [address] [place] [address] - negative except as listed above      Objective:     Vitals:    09/28/20 1359   BP: 138/80   Pulse: 81   Resp: 14   Temp: 98.4 °F (36.9 °C)   TempSrc: Oral   SpO2: 95%   Weight: 192 lb 14.4 oz (87.5 kg)   Height: 6' (1.829 m)       Physical Examination: General appearance - alert, well appearing, and in no distress  Back exam - full range of motion, no tenderness, palpable spasm or pain on motion  Neurological - alert, oriented, normal speech, no focal findings or movement disorder noted  Musculoskeletal - A right elbow exam was performed. The patient is Right hand dominant. TENDERNESS: moderate  Elbow Special Tests:  · Varus Stress Test (LCL/RCL) - negative  · Valgus Stress Test (MCL/UCL) - negative  · Mill's Test (lateral epicondylitis) - positive  · Cozen's Sign-resisted (lateral epicondylitis) - positive  · Passive Test (medial epicondylitis) - negative  · Resistive Test (medial epicondylitis) - negative    Extremities - peripheral pulses normal, no pedal edema, no clubbing or cyanosis  Skin - normal coloration and turgor, no rashes, no suspicious skin lesions noted   Time Out taken at:  2:17 PM  9/28/2020    * Patient was identified by name and date of birth   * Agreement on procedure being performed was verified  * Risks and Benefits explained to the patient  * Procedure site verified and marked as necessary  * Patient was positioned for comfort  * Consent was signed and verified   In the presence of: Witness: Fitz  Injection #: 1  Needle:  22 gauge  Procedure: This procedure was discussed with Kayleigh Ramirez and other therapeutic options were considered (risks vs benefits). Kayleigh Ramirez and I thought that an injection was merited. After informed consent was obtained, landmarks were identified(marked), and the right tendon  was cleansed with ChlorPrep in the standard sterile manner. 0.5 mL  1% lidocaine  and  0.5 mL Kenalog  was then injected and needle tenotomy was not performed. Procedure performed with ultrasound needle guidance. The needle was then withdrawn. T he procedure was well tolerated. The patient is asked to continue to rest the area for a few more days before resuming regular activities. It may be more painful for the first 1-2 days. NSAIDS are to be avoided. Watch for fever, or increased swelling or persistent pain in the joint.  Call or return to clinic prn if such symptoms occur or there is failure to improve as anticipated. The procedure did provide relief of symptoms in the clinic. RTC in 4 weeks for reevaluation and possible reinjection. Given the patient's body habitus and the anatomically deep nature of this structure, sonographic guidance is recommended to prevent injury to neurovascular structures and confirm accuracy of injection. Furthermore, this patient has failed conservative treatment with physical therapy and modalities and the diagnostic and therapeutic accuracy is important. Assessment/ Plan:   Diagnoses and all orders for this visit:    1. Right lateral epicondylitis  -     XR ELBOW RT MIN 3 V; Future  -     TRIAMCINOLONE ACETONIDE INJ  -     triamcinolone acetonide (Kenalog) 40 mg/mL injection; 1 mL by IntraMUSCular route once for 1 dose. -     KS INJECT TENDON ORIGIN/INSERT  -     AMB POC US, SONO GUIDE NEEDLE         Pathophysiology, recovery and rehabilitation process discussed and questions answered   Counseling for 30 Minutes of the total visit duration   Pictures and figures used as necessary   Provided reassurance   Handouts provided and reviewed with patient for tennis elbow  Monitor response to injection   Discussed steroid side effects of fat atrophy, hypopigmentation, steroid flare or infection   Recommend activity modification   Recommend  lower impact activities-walking, Eliptical, Nordic Track, cycling or swimming              1) Remember to stay active and/or exercise regularly (I suggest 30-45 minutes daily)   2) For reliable dietary information, go to www. EATRIGHT.org. You may wish to consider seeing the nutritionist at [facility] [phone], also consider the [address] I have discussed the diagnosis with the patient and the intended plan as seen in the above orders. The patient has received an after-visit summary and questions were answered concerning future plans.      Medication Side Effects and Warnings were discussed with patient,  Patient Labs were reviewed and or requested, and  Patient Past Records were reviewed and or requested  yes      Pt agrees to call or return to clinic and/or go to closest ER with any worsening of symptoms. This may include, but not limited to increased fever (>100.4) with NSAIDS or Tylenol, increased edema, confusion, rash, worsening of presenting symptoms. Please note that this dictation was completed with [employer], the computer voice recognition software. Quite often unanticipated grammatical, syntax, homophones, and other interpretive errors are inadvertently transcribed by the computer software. Please disregard these errors. Please excuse any errors that have escaped final proofreading. Thank you.

## 2020-12-03 ENCOUNTER — OFFICE VISIT (OUTPATIENT)
Dept: INTERNAL MEDICINE CLINIC | Age: 55
End: 2020-12-03
Payer: COMMERCIAL

## 2020-12-03 VITALS
WEIGHT: 192.8 LBS | HEART RATE: 85 BPM | TEMPERATURE: 98.4 F | DIASTOLIC BLOOD PRESSURE: 83 MMHG | SYSTOLIC BLOOD PRESSURE: 147 MMHG | OXYGEN SATURATION: 99 % | BODY MASS INDEX: 26.11 KG/M2 | RESPIRATION RATE: 14 BRPM | HEIGHT: 72 IN

## 2020-12-03 DIAGNOSIS — E11.51 DIABETIC PERIPHERAL VASCULAR DISORDER (HCC): ICD-10-CM

## 2020-12-03 DIAGNOSIS — E11.9 ENCOUNTER FOR DIABETES TYPE 2 EYE EXAM (HCC): ICD-10-CM

## 2020-12-03 DIAGNOSIS — E11.8 TYPE II DIABETES MELLITUS WITH COMPLICATION (HCC): ICD-10-CM

## 2020-12-03 DIAGNOSIS — M77.41 METATARSALGIA OF RIGHT FOOT: ICD-10-CM

## 2020-12-03 DIAGNOSIS — Z00.00 VISIT FOR WELL MAN HEALTH CHECK: Primary | ICD-10-CM

## 2020-12-03 DIAGNOSIS — M21.619 BUNION OF GREAT TOE: ICD-10-CM

## 2020-12-03 DIAGNOSIS — Z01.00 ENCOUNTER FOR DIABETES TYPE 2 EYE EXAM (HCC): ICD-10-CM

## 2020-12-03 DIAGNOSIS — M77.42 METATARSALGIA, LEFT FOOT: ICD-10-CM

## 2020-12-03 DIAGNOSIS — E11.9 COMPREHENSIVE DIABETIC FOOT EXAMINATION, TYPE 2 DM, ENCOUNTER FOR (HCC): ICD-10-CM

## 2020-12-03 PROCEDURE — 99396 PREV VISIT EST AGE 40-64: CPT | Performed by: FAMILY MEDICINE

## 2020-12-03 PROCEDURE — 73630 X-RAY EXAM OF FOOT: CPT | Performed by: FAMILY MEDICINE

## 2020-12-03 RX ORDER — LISINOPRIL 20 MG/1
TABLET ORAL
COMMUNITY
Start: 2020-11-23 | End: 2021-06-30 | Stop reason: ALTCHOICE

## 2020-12-03 RX ORDER — LOVASTATIN 40 MG/1
TABLET ORAL
COMMUNITY
Start: 2020-11-23 | End: 2021-06-30 | Stop reason: SDUPTHER

## 2020-12-03 RX ORDER — NAPROXEN 500 MG/1
500 TABLET ORAL 2 TIMES DAILY WITH MEALS
Qty: 30 TAB | Refills: 0 | Status: SHIPPED | OUTPATIENT
Start: 2020-12-03 | End: 2020-12-15

## 2020-12-03 NOTE — PROGRESS NOTES
Chief Complaint   Patient presents with    Foot Pain     he is a 50y.o. year old male who presents for evaluation of left foot pain  Pain Assessment Encounter      Kaylynn Dumont  12/3/2020  Onset of Symptoms: 2 years  ________________________________________________________________________  Description:pain with walking    Frequency: once a day  Pain Scale:(1-10): 6  Trauma Hx: no known trauma  Hx of similar symptoms: No:   Radiation: NO, foot  Duration:  continuous      Progression: is unchanged  What makes it better?: rest  What makes it worse?:walking  Medications tried: acetaminophen, ibuprofen    Reviewed and agree with Nurse Note and duplicated in this note. Reviewed PmHx, RxHx, FmHx, SocHx, AllgHx and updated and dated in the chart.     Family History   Problem Relation Age of Onset    Hypertension Mother     Hypertension Father     Stroke Father     Diabetes Father     Elevated Lipids Father        Past Medical History:   Diagnosis Date    Diabetes ([facility] [address]    [facility] H/O colonoscopy with polypectomy 08/31/2017    Dr Bruna Gomez High cholesterol     Hypertension       Social History     Socioeconomic History    Marital status: SINGLE     Spouse name: Not on file    Number of children: Not on file    Years of education: Not on file    Highest education level: Not on file   Tobacco Use    Smoking status: Current Every Day Smoker    Smokeless tobacco: Never Used   Substance and Sexual Activity    Alcohol use: No   Social History Narrative    Family from [address] [place] [address] - negative except as listed above      Objective:     Vitals:    12/03/20 1310   BP: (!) 147/83   Pulse: 85   Resp: 14   Temp: 98.4 °F (36.9 °C)   TempSrc: Oral   SpO2: 99%   Weight: 192 lb 12.8 oz (87.5 kg)   Height: 6' (1.829 m)       Physical Examination: General appearance - alert, well appearing, and in no distress  Eyes - pupils equal and reactive, extraocular eye movements intact  Ears - bilateral TM's and external ear canals normal  Nose - normal and patent, no erythema, discharge or polyps  Mouth - mucous membranes moist, pharynx normal without lesions  Neck - supple, no significant adenopathy  Chest - clear to auscultation, no wheezes, rales or rhonchi, symmetric air entry  Heart - normal rate, regular rhythm, normal S1, S2, no murmurs, rubs, clicks or gallops  Abdomen - soft, nontender, nondistended, no masses or organomegaly  Back exam - full range of motion, no tenderness, palpable spasm or pain on motion    Extremities - peripheral pulses normal, no pedal edema, no clubbing or cyanosis  Skin - normal coloration and turgor, no rashes, no suspicious skin lesions noted     Assessment/ Plan:   Diagnoses and all orders for this visit:    1. Visit for well [place] health check  -     CBC W/O DIFF; Future  -     PSA W/ REFLX FREE PSA; Future  -     URINALYSIS W/ RFLX MICROSCOPIC; Future  -     METABOLIC PANEL, COMPREHENSIVE; Future    2. Diabetic peripheral vascular disorder (HCC)  -      DIABETES FOOT EXAM  -     MICROALBUMIN, UR, RAND W/ MICROALB/CREAT RATIO; Future  -     HEMOGLOBIN A1C W/O EAG; Future  -     LIPID PANEL    3. Type II diabetes mellitus with complication (HCC)  -      DIABETES FOOT EXAM    4. Comprehensive diabetic foot examination, type 2 DM, encounter for ([facility]  -      DIABETES FOOT EXAM    5.  Encounter for diabetes type 2 eye exam ([facility]  -      DIABETES EYE EXAM    6. Metatarsalgia of right foot  -     XR FOOT RT MIN 3 V; Future         Pathophysiology, recovery and rehabilitation process discussed and questions answered   Counseling for 30 Minutes of the total visit duration   Pictures and figures used as necessary   Provided reassurance   Recommend activity modification   Recommend  lower impact activities-walking, Eliptical, Nordic Track, cycling or swimming   Follow up in 4 week(s)             1) Remember to stay active and/or exercise regularly (I suggest 30-45 minutes daily)   2) For reliable dietary information, go to www. EATRIGHT.org. You may wish to consider seeing the nutritionist at [facility] [phone], also consider the [address] I have discussed the diagnosis with the patient and the intended plan as seen in the above orders. The patient has received an after-visit summary and questions were answered concerning future plans. Medication Side Effects and Warnings were discussed with patient,  Patient Labs were reviewed and or requested, and  Patient Past Records were reviewed and or requested  yes      Pt agrees to call or return to clinic and/or go to closest ER with any worsening of symptoms. This may include, but not limited to increased fever (>100.4) with NSAIDS or Tylenol, increased edema, confusion, rash, worsening of presenting symptoms. Please note that this dictation was completed with Edgar, the [employer] voice recognition software. Quite often unanticipated grammatical, syntax, homophones, and other interpretive errors are inadvertently transcribed by the computer software. Please disregard these errors. Please excuse any errors that have escaped final proofreading. Thank you. Chief Complaint   Patient presents with    Foot Pain     he is a 50y.o. year old male who presents for CPE. Complete Physical Exam Questions:    1. Do you follow a low fat diet?  no  2. Are you up to date on your Tdap (<10 years)? Yes  3. Have you ever had a Pneumovax vaccine (>65)? No   PCV13 No   PPSV23 No  4. Have you had Zoster vaccine (>60)? No  5. Have you had the HPV - Gardasil (13- 26)? No  6. Do you follow an exercise program?  yes  7. Do you smoke?  yes If > 65 and smoker, have you had a abdominal aortic aneurysm ultrasound screen? No  8. Do you consider yourself overweight?  no  9. Is there a family history of CAD< age 48? Yes  10. Is there a family history of Cancer? No  11. Do you know your Cancer risks? No  12.   Have you had a colonoscopy? Yes  13. Have you been tested for HIV or other STI's? No HIV today(18-64 y/o)? No   14. Have you had an EKG in the last five years(>50)? No  15. Have you had a PSA test done this year (50-69)? No    Other complaints: feet pain    Reviewed and agree with Nurse Note and duplicated in this note. Reviewed PmHx, RxHx, FmHx, SocHx, AllgHx and updated and dated in the chart.     Family History   Problem Relation Age of Onset    Hypertension Mother     Hypertension Father     Stroke Father     Diabetes Father     Elevated Lipids Father        Past Medical History:   Diagnosis Date    Diabetes ([facility] [address]    Tristan Robledo H/O colonoscopy with polypectomy 08/31/2017    Dr Gregoria Washburn High cholesterol     Hypertension       Social History     Socioeconomic History    Marital status: SINGLE     Spouse name: Not on file    Number of children: Not on file    Years of education: Not on file    Highest education level: Not on file   Tobacco Use    Smoking status: Current Every Day Smoker    Smokeless tobacco: Never Used   Substance and Sexual Activity    Alcohol use: No   Social History Narrative    Family from [address] [place] [address] - negative except as listed above      Objective:     Vitals:    12/03/20 1310   BP: (!) 147/83   Pulse: 85   Resp: 14   Temp: 98.4 °F (36.9 °C)   TempSrc: Oral   SpO2: 99%   Weight: 192 lb 12.8 oz (87.5 kg)   Height: 6' (1.829 m)       Physical Examination: General appearance - alert, well appearing, and in no distress  Back exam - full range of motion, no tenderness, palpable spasm or pain on motion  Neurological - alert, oriented, normal speech, no focal findings or movement disorder noted  Musculoskeletal -left foot-bunion noted on great toe, pain with palpation of metatarsal heads, slight ulceration noted with no skin breakdown between toes 100  Extremities - peripheral pulses normal, no pedal edema, no clubbing or cyanosis  Skin - normal coloration and turgor, no rashes, no suspicious skin lesions noted       Assessment/ Plan:   Diagnoses and all orders for this visit:    1. Visit for well man health check  -     CBC W/O DIFF; Future  -     PSA W/ REFLX FREE PSA; Future  -     URINALYSIS W/ RFLX MICROSCOPIC; Future  -     METABOLIC PANEL, COMPREHENSIVE; Future    2. Diabetic peripheral vascular disorder (HCC)  -      DIABETES FOOT EXAM  -     MICROALBUMIN, UR, RAND W/ MICROALB/CREAT RATIO; Future  -     HEMOGLOBIN A1C W/O EAG; Future  -     LIPID PANEL    3. Type II diabetes mellitus with complication (HCC)  -      DIABETES FOOT EXAM    4. Comprehensive diabetic foot examination, type 2 DM, encounter for ([facility] [address]  -      DIABETES FOOT EXAM    5. Encounter for diabetes type 2 eye exam ([facility]  -      DIABETES EYE EXAM    6. Metatarsalgia of right foot  -     XR FOOT RT MIN 3 V; Future  -     REFERRAL TO PODIATRY    7. Bunion of great toe  -     REFERRAL TO PODIATRY    8. Metatarsalgia, left foot  -     REFERRAL TO PODIATRY           Labs to be drawn: CBC, CMP, Lipid            I have discussed the diagnosis with the patient and the intended plan as seen in the above orders. The patient has received an after-visit summary and questions were answered concerning future plans. Medication Side Effects and Warnings were discussed with patient,  Patient Labs were reviewed and or requested, and  Patient Past Records were reviewed and or requested  yes         Pt agrees to call or return to clinic and/or go to closest ER with any worsening of symptoms. This may include, but not limited to increased fever (>100.4) with NSAIDS or Tylenol, increased edema, confusion, rash, worsening of presenting symptoms. Please note that this dictation was completed with [email or web address], the [employer] voice recognition software. Quite often unanticipated grammatical, syntax, homophones, and other interpretive errors are inadvertently transcribed by the computer software.   Please disregard these errors. Please excuse any errors that have escaped final proofreading. Thank you.

## 2020-12-04 LAB
ALBUMIN SERPL-MCNC: 4.6 G/DL (ref 3.8–4.9)
ALBUMIN/CREAT UR: 17 MG/G CREAT (ref 0–29)
ALBUMIN/GLOB SERPL: 1.8 {RATIO} (ref 1.2–2.2)
ALP SERPL-CCNC: 74 IU/L (ref 39–117)
ALT SERPL-CCNC: 14 IU/L (ref 0–44)
APPEARANCE UR: CLEAR
AST SERPL-CCNC: 16 IU/L (ref 0–40)
BILIRUB SERPL-MCNC: 0.4 MG/DL (ref 0–1.2)
BILIRUB UR QL STRIP: NEGATIVE
BUN SERPL-MCNC: 8 MG/DL (ref 6–24)
BUN/CREAT SERPL: 11 (ref 9–20)
CALCIUM SERPL-MCNC: 9.9 MG/DL (ref 8.7–10.2)
CHLORIDE SERPL-SCNC: 100 MMOL/L (ref 96–106)
CHOLEST SERPL-MCNC: 207 MG/DL (ref 100–199)
CO2 SERPL-SCNC: 24 MMOL/L (ref 20–29)
COLOR UR: YELLOW
CREAT SERPL-MCNC: 0.71 MG/DL (ref 0.76–1.27)
CREAT UR-MCNC: 226.8 MG/DL
ERYTHROCYTE [DISTWIDTH] IN BLOOD BY AUTOMATED COUNT: 12.8 % (ref 11.6–15.4)
GLOBULIN SER CALC-MCNC: 2.6 G/DL (ref 1.5–4.5)
GLUCOSE SERPL-MCNC: 106 MG/DL (ref 65–99)
GLUCOSE UR QL: ABNORMAL
HBA1C MFR BLD: 6.5 % (ref 4.8–5.6)
HCT VFR BLD AUTO: 43.1 % (ref 37.5–51)
HDLC SERPL-MCNC: 96 MG/DL
HGB BLD-MCNC: 14.6 G/DL (ref 13–17.7)
HGB UR QL STRIP: NEGATIVE
KETONES UR QL STRIP: ABNORMAL
LDLC SERPL CALC-MCNC: 100 MG/DL (ref 0–99)
LEUKOCYTE ESTERASE UR QL STRIP: NEGATIVE
MCH RBC QN AUTO: 30.1 PG (ref 26.6–33)
MCHC RBC AUTO-ENTMCNC: 33.9 G/DL (ref 31.5–35.7)
MCV RBC AUTO: 89 FL (ref 79–97)
MICRO URNS: ABNORMAL
MICROALBUMIN UR-MCNC: 38.5 UG/ML
NITRITE UR QL STRIP: NEGATIVE
PH UR STRIP: 5 [PH] (ref 5–7.5)
PLATELET # BLD AUTO: 273 X10E3/UL (ref 150–450)
POTASSIUM SERPL-SCNC: 4.7 MMOL/L (ref 3.5–5.2)
PROT SERPL-MCNC: 7.2 G/DL (ref 6–8.5)
PROT UR QL STRIP: ABNORMAL
PSA SERPL-MCNC: 1.9 NG/ML (ref 0–4)
RBC # BLD AUTO: 4.85 X10E6/UL (ref 4.14–5.8)
REFLEX CRITERIA: NORMAL
SODIUM SERPL-SCNC: 139 MMOL/L (ref 134–144)
SP GR UR: 1.02 (ref 1–1.03)
TRIGL SERPL-MCNC: 61 MG/DL (ref 0–149)
UROBILINOGEN UR STRIP-MCNC: 0.2 MG/DL (ref 0.2–1)
VLDLC SERPL CALC-MCNC: 11 MG/DL (ref 5–40)
WBC # BLD AUTO: 6.8 X10E3/UL (ref 3.4–10.8)

## 2020-12-15 RX ORDER — NAPROXEN 500 MG/1
TABLET ORAL
Qty: 30 TAB | Refills: 0 | Status: SHIPPED | OUTPATIENT
Start: 2020-12-15 | End: 2020-12-28

## 2020-12-28 RX ORDER — NAPROXEN 500 MG/1
TABLET ORAL
Qty: 30 TAB | Refills: 0 | Status: SHIPPED | OUTPATIENT
Start: 2020-12-28 | End: 2021-02-01

## 2021-02-01 RX ORDER — NAPROXEN 500 MG/1
TABLET ORAL
Qty: 30 TAB | Refills: 0 | Status: SHIPPED | OUTPATIENT
Start: 2021-02-01 | End: 2021-02-15

## 2021-02-15 RX ORDER — NAPROXEN 500 MG/1
TABLET ORAL
Qty: 30 TAB | Refills: 0 | Status: SHIPPED | OUTPATIENT
Start: 2021-02-15 | End: 2021-06-30 | Stop reason: ALTCHOICE

## 2021-06-30 ENCOUNTER — OFFICE VISIT (OUTPATIENT)
Dept: INTERNAL MEDICINE CLINIC | Age: 56
End: 2021-06-30
Payer: COMMERCIAL

## 2021-06-30 VITALS
TEMPERATURE: 98.2 F | SYSTOLIC BLOOD PRESSURE: 166 MMHG | HEIGHT: 72 IN | DIASTOLIC BLOOD PRESSURE: 113 MMHG | WEIGHT: 191.1 LBS | HEART RATE: 90 BPM | RESPIRATION RATE: 16 BRPM | BODY MASS INDEX: 25.88 KG/M2 | OXYGEN SATURATION: 94 %

## 2021-06-30 DIAGNOSIS — M79.672 LEFT FOOT PAIN: ICD-10-CM

## 2021-06-30 DIAGNOSIS — M75.41 IMPINGEMENT SYNDROME OF RIGHT SHOULDER: ICD-10-CM

## 2021-06-30 DIAGNOSIS — Z12.11 COLON CANCER SCREENING: ICD-10-CM

## 2021-06-30 DIAGNOSIS — E11.8 TYPE 2 DIABETES MELLITUS WITH COMPLICATION, WITHOUT LONG-TERM CURRENT USE OF INSULIN (HCC): ICD-10-CM

## 2021-06-30 DIAGNOSIS — Z00.00 VISIT FOR WELL MAN HEALTH CHECK: Primary | ICD-10-CM

## 2021-06-30 DIAGNOSIS — E78.5 HYPERLIPIDEMIA, UNSPECIFIED HYPERLIPIDEMIA TYPE: ICD-10-CM

## 2021-06-30 DIAGNOSIS — I10 ESSENTIAL HYPERTENSION: ICD-10-CM

## 2021-06-30 DIAGNOSIS — Z11.59 NEED FOR HEPATITIS C SCREENING TEST: ICD-10-CM

## 2021-06-30 PROCEDURE — 99396 PREV VISIT EST AGE 40-64: CPT | Performed by: FAMILY MEDICINE

## 2021-06-30 PROCEDURE — 73030 X-RAY EXAM OF SHOULDER: CPT | Performed by: FAMILY MEDICINE

## 2021-06-30 PROCEDURE — 99214 OFFICE O/P EST MOD 30 MIN: CPT | Performed by: FAMILY MEDICINE

## 2021-06-30 RX ORDER — ATORVASTATIN CALCIUM 20 MG/1
TABLET, FILM COATED ORAL
Qty: 90 TABLET | Refills: 4 | Status: SHIPPED | OUTPATIENT
Start: 2021-06-30 | End: 2022-09-08 | Stop reason: SDUPTHER

## 2021-06-30 RX ORDER — LOVASTATIN 40 MG/1
TABLET ORAL
Qty: 90 TABLET | Refills: 3 | Status: SHIPPED | OUTPATIENT
Start: 2021-06-30

## 2021-06-30 RX ORDER — METFORMIN HYDROCHLORIDE 500 MG/1
500 TABLET ORAL 2 TIMES DAILY WITH MEALS
Qty: 180 TABLET | Refills: 3 | Status: SHIPPED | OUTPATIENT
Start: 2021-06-30 | End: 2022-09-08 | Stop reason: SDUPTHER

## 2021-06-30 RX ORDER — LISINOPRIL 40 MG/1
40 TABLET ORAL DAILY
Qty: 90 TABLET | Refills: 4 | Status: SHIPPED | OUTPATIENT
Start: 2021-06-30 | End: 2022-09-08 | Stop reason: SDUPTHER

## 2021-06-30 NOTE — PROGRESS NOTES
Chief Complaint   Patient presents with    Shoulder Pain     right    Foot Pain     left, x1 year     Chief Complaint   Patient presents with    Shoulder Pain     right    Foot Pain     left, x1 year     he is a 50y.o. year old male who presents for CPE. Complete Physical Exam Questions:    1. Do you follow a low fat diet?  no  2. Are you up to date on your Tdap (<10 years)? Yes  3. Have you ever had a Pneumovax vaccine (>65)? No   PCV13 No   PPSV23 No  4. Have you had Zoster vaccine (>60)? No  5. Have you had the HPV - Gardasil (13- 26)? No  6. Do you follow an exercise program?  yes  7. Do you smoke?  no If > 65 and smoker, have you had a abdominal aortic aneurysm ultrasound screen? No  8. Do you consider yourself overweight?  no  9. Is there a family history of CAD< age 48? No  10. Is there a family history of Cancer? No  11. Do you know your Cancer risks? No  12. Have you had a colonoscopy? No  13. Have you been tested for HIV or other STI's? No HIV today(18-66 y/o)? No   14. Have you had an EKG in the last five years(>50)? No  15. Have you had a PSA test done this year (50-69)? No        Reviewed and agree with Nurse Note and duplicated in this note. Reviewed PmHx, RxHx, FmHx, SocHx, AllgHx and updated and dated in the chart.     Family History   Problem Relation Age of Onset    Hypertension Mother     Hypertension Father     Stroke Father     Diabetes Father     Elevated Lipids Father        Past Medical History:   Diagnosis Date    Diabetes ([facility] [address]    Jameson Fox H/O colonoscopy with polypectomy 08/31/2017    Dr Hebert Ochoa High cholesterol     Hypertension       Social History     Socioeconomic History    Marital status: SINGLE     Spouse name: Not on file    Number of children: Not on file    Years of education: Not on file    Highest education level: Not on file   Tobacco Use    Smoking status: Current Every Day Smoker    Smokeless tobacco: Never Used   Vaping Use    Vaping Use: Never used   Substance and Sexual Activity    Alcohol use: No   Social History Narrative    Family from [facility]     Social Determinants of Health     Financial Resource Strain:     Difficulty of Paying Living Expenses:    Food Insecurity:     Worried About Running Out of Food in the Last Year:     [address] in the Last Year:    Transportation Needs:     Lack of Transportation (Medical):      Lack of Transportation (Non-Medical):    Physical Activity:     Days of Exercise per Week:     Minutes of Exercise per Session:    Stress:     Feeling of Stress :    Social Connections:     Frequency of Communication with Friends and Family:     Frequency of Social Gatherings with Friends and Family:     Attends Church Services:     Active Member of Clubs or Organizations:     Attends Club or Organization Meetings:     Marital Status:         Review of Systems - negative except as listed above      Objective:     Vitals:    06/30/21 1538   BP: (!) 166/113   Pulse: 90   Resp: 16   Temp: 98.2 °F (36.8 °C)   TempSrc: Oral   SpO2: 94%   Weight: 191 lb 1.6 oz (86.7 kg)   Height: 6' (1.829 m)       Physical Examination: General appearance - alert, well appearing, and in no distress  Eyes - pupils equal and reactive, extraocular eye movements intact  Ears - bilateral TM's and external ear canals normal  Nose - normal and patent, no erythema, discharge or polyps  Mouth - mucous membranes moist, pharynx normal without lesions  Neck - supple, no significant adenopathy  Chest - clear to auscultation, no wheezes, rales or rhonchi, symmetric air entry  Heart - normal rate, regular rhythm, normal S1, S2, no murmurs, rubs, clicks or gallops  Abdomen - soft, nontender, nondistended, no masses or organomegaly  Musculoskeletal - no joint tenderness, deformity or swelling  Extremities - peripheral pulses normal, no pedal edema, no clubbing or cyanosis  Skin - normal coloration and turgor, no rashes, no suspicious skin lesions noted       Assessment/ Plan:   Diagnoses and all orders for this visit:    1. Visit for well man health check  -     CBC W/O DIFF; Future  -     PSA W/ REFLX FREE PSA; Future    2. Type 2 diabetes mellitus with complication, without long-term current use of insulin (HCC)  -     metFORMIN (GLUCOPHAGE) 500 mg tablet; Take 1 Tablet by mouth two (2) times daily (with meals). -     lisinopriL (PRINIVIL, ZESTRIL) 40 mg tablet; Take 1 Tablet by mouth daily. Overdue for visit. Make appointment in 1-2 weeks. -     atorvastatin (LIPITOR) 20 mg tablet; take 1 tablet by mouth once daily  -     HEMOGLOBIN A1C WITH EAG; Future    3. Essential hypertension  -     lisinopriL (PRINIVIL, ZESTRIL) 40 mg tablet; Take 1 Tablet by mouth daily. Overdue for visit. Make appointment in 1-2 weeks. 4. Hyperlipidemia, unspecified hyperlipidemia type  -     atorvastatin (LIPITOR) 20 mg tablet; take 1 tablet by mouth once daily  -     LIPID PANEL; Future  -     METABOLIC PANEL, COMPREHENSIVE; Future    5. Impingement syndrome of right shoulder  -     XR SHOULDER RT AP/LAT MIN 2 V; Future  -     REFERRAL TO PHYSICAL THERAPY    6. Left foot pain  -     REFERRAL TO ORTHOPEDICS    7. Need for hepatitis C screening test  -     HEPATITIS C AB; Future    Other orders  -     lovastatin (MEVACOR) 40 mg tablet; TK 1 T PO  D WITH EVENING MEAL       Follow-up and Dispositions    · Return in about 1 week (around 7/7/2021) for Injury/pain f/u, HTN. Labs to be drawn: CBC, CMP, Lipid            I have discussed the diagnosis with the patient and the intended plan as seen in the above orders. The patient has received an after-visit summary and questions were answered concerning future plans.        Medication Side Effects and Warnings were discussed with patient,  Patient Labs were reviewed and or requested, and  Patient Past Records were reviewed and or requested  yes         Pt agrees to call or return to clinic and/or go to closest ER with any worsening of symptoms. This may include, but not limited to increased fever (>100.4) with NSAIDS or Tylenol, increased edema, confusion, rash, worsening of presenting symptoms. Please note that this dictation was completed with [employer], the computer voice recognition software. Quite often unanticipated grammatical, syntax, homophones, and other interpretive errors are inadvertently transcribed by the computer software. Please disregard these errors. Please excuse any errors that have escaped final proofreading. Thank you.    he is a 50y.o. year old male who presents for follow up of injury. Follow Up Pain Assessment Encounter      Onset of Symptoms: 1 year  ________________________________________________________________________  Description: Pain is now 75% has worsened      Pain Scale:(1-10): 10 (feet)  Duration:  continuous  Radiation: N/A  What makes it better?: rest  What makes it worse?:walking  Medications tried: ibuprofen  Modalities tried: wearing steal toed boots    Patient states he has not taken medication in many months which includes his blood pressure medications and also diabetes along with cholesterol. Patient has been working both in and out of town has been very busy lately. He denies any side effect from medication and denies any current chest pain shortness of breath or YO. Reviewed and agree with Nurse Note and duplicated in this note. Reviewed PmHx, RxHx, FmHx, SocHx, AllgHx and updated and dated in the chart.     Family History   Problem Relation Age of Onset    Hypertension Mother     Hypertension Father     Stroke Father     Diabetes Father     Elevated Lipids Father        Past Medical History:   Diagnosis Date    Diabetes ([address]    [facility] H/O colonoscopy with polypectomy 08/31/2017    Dr Geetha Reyna High cholesterol     Hypertension       Social History     Socioeconomic History    Marital status: SINGLE     Spouse name: Not on file    Number of children: Not on file    Years of education: Not on file    Highest education level: Not on file   Tobacco Use    Smoking status: Current Every Day Smoker    Smokeless tobacco: Never Used   Vaping Use    Vaping Use: Never used   Substance and Sexual Activity    Alcohol use: No   Social History Narrative    Family from [facility]     Social Determinants of Health     Financial Resource Strain:     Difficulty of Paying Living Expenses:    Food Insecurity:     Worried About [address] in the Last Year:     [address] in the Last Year:    Transportation Needs:     Lack of Transportation (Medical):  Lack of Transportation (Non-Medical):    Physical Activity:     Days of Exercise per Week:     Minutes of Exercise per Session:    Stress:     Feeling of Stress :    Social Connections:     Frequency of Communication with Friends and Family:     Frequency of Social Gatherings with Friends and Family:     Attends [employer] Services:     Active Member of Clubs or Organizations:     Attends Club or Organization Meetings:     Marital Status:         Review of Systems - negative except as listed above      Objective:     Vitals:    06/30/21 1538   BP: (!) 166/113   Pulse: 90   Resp: 16   Temp: 98.2 °F (36.8 °C)   TempSrc: Oral   SpO2: 94%   Weight: 191 lb 1.6 oz (86.7 kg)   Height: 6' (1.829 m)       Physical Examination: General appearance - alert, well appearing, and in no distress  Back exam - full range of motion, no tenderness, palpable spasm or pain on motion  Neurological - alert, oriented, normal speech, no focal findings or movement disorder noted  Musculoskeletal - The right shoulder is  normal to inspection. The patient has diminished range with pain  . The shoulderis not tender to palpation . Bicep tendon: non-tender  The NEER test is positive. The Zamora test:is positive   The Cross over test:is  negative. The Empty Can test:is  negative.   Stressed ext rotation is:  negative. Stressed int rotation: negative. The Apprehension Sign is negative. The Lift off test is:  negative   Examination reveals the Painful Arch:  not tested. The Labral Test is:  not tested. The Sulcus Sign is:  not tested. Left foot-minor on first and fifth MTPs, pain with squeeze test, no pus or drainage, decreased range of motion of all toes  Extremities - peripheral pulses normal, no pedal edema, no clubbing or cyanosis  Skin - normal coloration and turgor, no rashes, no suspicious skin lesions noted     Assessment/ Plan:   Diagnoses and all orders for this visit:    1. Visit for [facility] health check  -     CBC W/O DIFF; Future  -     PSA W/ REFLX FREE PSA; Future    2. Type 2 diabetes mellitus with complication, without long-term current use of insulin (HCC)  -     metFORMIN (GLUCOPHAGE) 500 mg tablet; Take 1 Tablet by mouth two (2) times daily (with meals). -     lisinopriL (PRINIVIL, ZESTRIL) 40 mg tablet; Take 1 Tablet by mouth daily. Overdue for visit. Make appointment in 1-2 weeks. -     atorvastatin (LIPITOR) 20 mg tablet; take 1 tablet by mouth once daily  -     HEMOGLOBIN A1C WITH EAG; Future    3. Essential hypertension  -     lisinopriL (PRINIVIL, ZESTRIL) 40 mg tablet; Take 1 Tablet by mouth daily. Overdue for visit. Make appointment in 1-2 weeks. 4. Hyperlipidemia, unspecified hyperlipidemia type  -     atorvastatin (LIPITOR) 20 mg tablet; take 1 tablet by mouth once daily  -     LIPID PANEL; Future  -     METABOLIC PANEL, COMPREHENSIVE; Future    5. Impingement syndrome of right shoulder  -     XR SHOULDER RT AP/LAT MIN 2 V; Future  -     REFERRAL TO PHYSICAL THERAPY    6. Left foot pain  -     REFERRAL TO ORTHOPEDICS    7. Need for hepatitis C screening test  -     HEPATITIS C AB; Future    8.  Colon cancer screening  -     COLOGUARD TEST (FECAL DNA COLORECTAL CANCER SCREENING)    Other orders  -     lovastatin (MEVACOR) 40 mg tablet; TK 1 T PO  D WITH EVENING MEAL Pathophysiology, recovery and rehabilitation process discussed and questions answered   Counseling for 30 Minutes of the total visit duration   Pictures and figures used as necessary   Provided reassurance   Monitor response to Physical Therapy   Recommend activity modification   Recommend  lower impact activities-walking, Eliptical, Nordic Track, cycling or swimming   Follow up in 4 week(s)              I have discussed the diagnosis with the patient and the intended plan as seen in the above orders. The patient has received an after-visit summary and questions were answered concerning future plans.      Medication Side Effects and Warnings were discussed with patient,  Patient Labs were reviewed and or requested, and  Patient Past Records were reviewed and or requested  yes

## 2021-06-30 NOTE — PATIENT INSTRUCTIONS
DASH Diet: Care Instructions  Your Care Instructions     The DASH diet is an eating plan that can help lower your blood pressure. DASH stands for Dietary Approaches to Stop Hypertension. Hypertension is high blood pressure. The DASH diet focuses on eating foods that are high in calcium, potassium, and magnesium. These nutrients can lower blood pressure. The foods that are highest in these nutrients are fruits, vegetables, low-fat dairy products, nuts, seeds, and legumes. But taking calcium, potassium, and magnesium supplements instead of eating foods that are high in those nutrients does not have the same effect. The DASH diet also includes whole grains, fish, and poultry. The DASH diet is one of several lifestyle changes your doctor may recommend to lower your high blood pressure. Your doctor may also want you to decrease the amount of sodium in your diet. Lowering sodium while following the DASH diet can lower blood pressure even further than just the DASH diet alone. Follow-up care is a key part of your treatment and safety. Be sure to make and go to all appointments, and call your doctor if you are having problems. It's also a good idea to know your test results and keep a list of the medicines you take. How can you care for yourself at home? Following the DASH diet  · Eat 4 to 5 servings of fruit each day. A serving is 1 medium-sized piece of fruit, ½ cup chopped or canned fruit, 1/4 cup dried fruit, or 4 ounces (½ cup) of fruit juice. Choose fruit more often than fruit juice. · Eat 4 to 5 servings of vegetables each day. A serving is 1 cup of lettuce or raw leafy vegetables, ½ cup of chopped or cooked vegetables, or 4 ounces (½ cup) of vegetable juice. Choose vegetables more often than vegetable juice. · Get 2 to 3 servings of low-fat and fat-free dairy each day. A serving is 8 ounces of milk, 1 cup of yogurt, or 1 ½ ounces of cheese. · Eat 6 to 8 servings of grains each day.  A serving is 1 slice of bread, 1 ounce of dry cereal, or ½ cup of cooked rice, pasta, or cooked cereal. Try to choose whole-grain products as much as possible. · Limit lean meat, poultry, and fish to 2 servings each day. A serving is 3 ounces, about the size of a deck of cards. · Eat 4 to 5 servings of nuts, seeds, and legumes (cooked dried beans, lentils, and split peas) each week. A serving is 1/3 cup of nuts, 2 tablespoons of seeds, or ½ cup of cooked beans or peas. · Limit fats and oils to 2 to 3 servings each day. A serving is 1 teaspoon of vegetable oil or 2 tablespoons of salad dressing. · Limit sweets and added sugars to 5 servings or less a week. A serving is 1 tablespoon jelly or jam, ½ cup sorbet, or 1 cup of lemonade. · Eat less than 2,300 milligrams (mg) of sodium a day. If you limit your sodium to 1,500 mg a day, you can lower your blood pressure even more. · Be aware that all of these are the suggested number of servings for people who eat 1,800 to 2,000 calories a day. Your recommended number of servings may be different if you need more or fewer calories. Tips for success  · Start small. Do not try to make dramatic changes to your diet all at once. You might feel that you are missing out on your favorite foods and then be more likely to not follow the plan. Make small changes, and stick with them. Once those changes become habit, add a few more changes. · Try some of the following:  ? Make it a goal to eat a fruit or vegetable at every meal and at snacks. This will make it easy to get the recommended amount of fruits and vegetables each day. ? Try yogurt topped with fruit and nuts for a snack or healthy dessert. ? Add lettuce, tomato, cucumber, and onion to sandwiches. ? Combine a ready-made pizza crust with low-fat mozzarella cheese and lots of vegetable toppings. Try using tomatoes, squash, spinach, broccoli, carrots, cauliflower, and onions. ?  Have a variety of cut-up vegetables with a low-fat dip as an appetizer instead of chips and dip. ? Sprinkle sunflower seeds or chopped almonds over salads. Or try adding chopped walnuts or almonds to cooked vegetables. ? Try some vegetarian meals using beans and peas. Add garbanzo or kidney beans to salads. Make burritos and tacos with mashed jade beans or black beans. Where can you learn more? Go to http://www.key.com/  Enter H967 in the search box to learn more about \"DASH Diet: Care Instructions. \"  Current as of: August 31, 2020               Content Version: 12.8  © 2720-6216 [email or web address]. Care instructions adapted under license by [employer] (which disclaims liability or warranty for this information). If you have questions about a medical condition or this instruction, always ask your healthcare professional. [address] any warranty or liability for your use of this information.

## 2021-07-01 LAB
ALBUMIN SERPL-MCNC: 4.7 G/DL (ref 3.8–4.9)
ALBUMIN/GLOB SERPL: 1.8 {RATIO} (ref 1.2–2.2)
ALP SERPL-CCNC: 72 IU/L (ref 48–121)
ALT SERPL-CCNC: 13 IU/L (ref 0–44)
AST SERPL-CCNC: 17 IU/L (ref 0–40)
BILIRUB SERPL-MCNC: 0.4 MG/DL (ref 0–1.2)
BUN SERPL-MCNC: 10 MG/DL (ref 6–24)
BUN/CREAT SERPL: 13 (ref 9–20)
CALCIUM SERPL-MCNC: 9.5 MG/DL (ref 8.7–10.2)
CHLORIDE SERPL-SCNC: 100 MMOL/L (ref 96–106)
CHOLEST SERPL-MCNC: 269 MG/DL (ref 100–199)
CO2 SERPL-SCNC: 22 MMOL/L (ref 20–29)
CREAT SERPL-MCNC: 0.77 MG/DL (ref 0.76–1.27)
ERYTHROCYTE [DISTWIDTH] IN BLOOD BY AUTOMATED COUNT: 13.2 % (ref 11.6–15.4)
EST. AVERAGE GLUCOSE BLD GHB EST-MCNC: 146 MG/DL
GLOBULIN SER CALC-MCNC: 2.6 G/DL (ref 1.5–4.5)
GLUCOSE SERPL-MCNC: 150 MG/DL (ref 65–99)
HBA1C MFR BLD: 6.7 % (ref 4.8–5.6)
HCT VFR BLD AUTO: 44.3 % (ref 37.5–51)
HCV AB S/CO SERPL IA: 0.4 S/CO RATIO (ref 0–0.9)
HDLC SERPL-MCNC: 118 MG/DL
HGB BLD-MCNC: 14.8 G/DL (ref 13–17.7)
LDLC SERPL CALC-MCNC: 143 MG/DL (ref 0–99)
MCH RBC QN AUTO: 29.3 PG (ref 26.6–33)
MCHC RBC AUTO-ENTMCNC: 33.4 G/DL (ref 31.5–35.7)
MCV RBC AUTO: 88 FL (ref 79–97)
PLATELET # BLD AUTO: 254 X10E3/UL (ref 150–450)
POTASSIUM SERPL-SCNC: 4.3 MMOL/L (ref 3.5–5.2)
PROT SERPL-MCNC: 7.3 G/DL (ref 6–8.5)
PSA SERPL-MCNC: 1.6 NG/ML (ref 0–4)
RBC # BLD AUTO: 5.05 X10E6/UL (ref 4.14–5.8)
REFLEX CRITERIA: NORMAL
SODIUM SERPL-SCNC: 138 MMOL/L (ref 134–144)
TRIGL SERPL-MCNC: 55 MG/DL (ref 0–149)
VLDLC SERPL CALC-MCNC: 8 MG/DL (ref 5–40)
WBC # BLD AUTO: 7.3 X10E3/UL (ref 3.4–10.8)

## 2021-07-12 ENCOUNTER — OFFICE VISIT (OUTPATIENT)
Dept: INTERNAL MEDICINE CLINIC | Age: 56
End: 2021-07-12
Payer: COMMERCIAL

## 2021-07-12 VITALS
BODY MASS INDEX: 26.26 KG/M2 | DIASTOLIC BLOOD PRESSURE: 66 MMHG | RESPIRATION RATE: 16 BRPM | WEIGHT: 193.9 LBS | TEMPERATURE: 98.6 F | OXYGEN SATURATION: 97 % | SYSTOLIC BLOOD PRESSURE: 100 MMHG | HEART RATE: 90 BPM | HEIGHT: 72 IN

## 2021-07-12 DIAGNOSIS — M65.341 TRIGGER RING FINGER OF RIGHT HAND: Primary | ICD-10-CM

## 2021-07-12 PROCEDURE — 20550 NJX 1 TENDON SHEATH/LIGAMENT: CPT | Performed by: FAMILY MEDICINE

## 2021-07-12 PROCEDURE — 99214 OFFICE O/P EST MOD 30 MIN: CPT | Performed by: FAMILY MEDICINE

## 2021-07-12 PROCEDURE — 76942 ECHO GUIDE FOR BIOPSY: CPT | Performed by: FAMILY MEDICINE

## 2021-07-12 RX ORDER — TRIAMCINOLONE ACETONIDE 40 MG/ML
40 INJECTION, SUSPENSION INTRA-ARTICULAR; INTRAMUSCULAR ONCE
Status: COMPLETED | OUTPATIENT
Start: 2021-07-12 | End: 2021-07-12

## 2021-07-12 RX ADMIN — TRIAMCINOLONE ACETONIDE 40 MG: 40 INJECTION, SUSPENSION INTRA-ARTICULAR; INTRAMUSCULAR at 18:02

## 2021-07-12 NOTE — PATIENT INSTRUCTIONS
If you have had an injection today, continue to rest the area for a few more days before resuming regular activities. It may be more painful for the first 1-2 days. NSAIDS are to be avoided. Watch for fever, or increased swelling or persistent pain in the joint. Call or return to clinic prn if such symptoms occur or there is failure to improve as anticipated. 1) Remember to stay active and/or exercise regularly (I suggest 30-45 minutes daily)   2) For reliable dietary information, go to www. EATRIGHT.org. You may wish to consider seeing the nutritionists:  Sanaz Barr @ [facility] - [phone] / [phone]  Or:  Radhames Pacheco @ [facility] or [address] [place] [address] [phone],    - also consider the Mediterranean diet and DASH diets  3) I routinely suggest a complete physical exam once each year (your birth month)

## 2021-07-12 NOTE — PROGRESS NOTES
Chief Complaint   Patient presents with    Finger Pain     Patient is here left finger pain. he is a 50y.o. year old male who presents for evaluation of left finger pain  Pain Assessment Encounter      Claudean Rubenstein  7/12/2021  Onset of Symptoms: Patient states he has had finger pain a couple of weeks    ________________________________________________________________________  Description:Patient states no injures    Frequency: 4-5 times a day  Pain Scale:(1-10): 8  Trauma Hx: None   Hx of similar symptoms: No:   Radiation: NO,   Duration:  continuous      Progression: has worsened  What makes it better?: heat, ice and OTC meds  What makes it worse?:exercise and strecthing  Medications tried: acetaminophen, ibuprofen, aspirin    Reviewed and agree with Nurse Note and duplicated in this note. Reviewed PmHx, RxHx, FmHx, SocHx, AllgHx and updated and dated in the chart.     Family History   Problem Relation Age of Onset    Hypertension Mother     Hypertension Father     Stroke Father     Diabetes Father     Elevated Lipids Father        Past Medical History:   Diagnosis Date    Diabetes ([address]    [facility] H[facility]O colonoscopy with polypectomy 08/31/2017    Dr Leonid Johnson High cholesterol     Hypertension       Social History     Socioeconomic History    Marital status: SINGLE     Spouse name: Not on file    Number of children: Not on file    Years of education: Not on file    Highest education level: Not on file   Tobacco Use    Smoking status: Current Every Day Smoker    Smokeless tobacco: Never Used   Vaping Use    Vaping Use: Never used   Substance and Sexual Activity    Alcohol use: No   Social History Narrative    [facility] Strain:     Difficulty of Paying Living Expenses:    Food Insecurity:     Worried About [address] in the Last Year:     [address] in the Last Year:    Transportation Needs:     Lack of Transportation (Medical):  Lack of Transportation (Non-Medical):    Physical Activity:     Days of Exercise per Week:     Minutes of Exercise per Session:    Stress:     Feeling of Stress :    Social Connections:     Frequency of Communication with Friends and Family:     Frequency of Social Gatherings with Friends and Family:     Attends Shinto Services:     Active Member of Clubs or Organizations:     Attends Club or Organization Meetings:     Marital Status:         Review of Systems - negative except as listed above      Objective:     Vitals:    07/12/21 1608   BP: 100/66   Pulse: 90   Resp: 16   Temp: 98.6 °F (37 °C)   SpO2: 97%   Weight: 193 lb 14.4 oz (88 kg)   Height: 6' (1.829 m)       Physical Examination: General appearance - alert, well appearing, and in no distress  Back exam - full range of motion, no tenderness, palpable spasm or pain on motion  Neurological - alert, oriented, normal speech, no focal findings or movement disorder noted  Musculoskeletal -right hand-fourth digit, pain to palpation of her A1 pulley of palpation of flexor tendon, no triggering noted  Extremities - peripheral pulses normal, no pedal edema, no clubbing or cyanosis  Skin - normal coloration and turgor, no rashes, no suspicious skin lesions noted   Time Out taken at:  4:48 PM  7/12/2021    * Patient was identified by name and date of birth   * Agreement on procedure being performed was verified  * Risks and Benefits explained to the patient  * Procedure site verified and marked as necessary  * Patient was positioned for comfort  * Consent was signed and verified   In the presence of: Witness: mira Fuentes #: 1  Needle:  25  Procedure: This procedure was discussed with Jose E Lyons and other therapeutic options were considered (risks vs benefits). Jose E Lyons and I thought that an injection was merited.   After informed consent was obtained, landmarks were identified(marked), and the right tendon  was cleansed with ChlorPrep in the standard sterile manner. 0.25mL  1% lidocaine  and  0.25mL Kenalog  was then injected and needle tenotomy was not performed. Procedure performed with ultrasound needle guidance. The needle was then withdrawn. T he procedure was well tolerated. The patient is asked to continue to rest the area for a few more days before resuming regular activities. It may be more painful for the first 1-2 days. NSAIDS are to be avoided. Watch for fever, or increased swelling or persistent pain in the joint. Call or return to clinic prn if such symptoms occur or there is failure to improve as anticipated. The procedure did provide relief of symptoms in the clinic. RTC in 4 weeks for reevaluation and possible reinjection. Given the patient's body habitus and the anatomically deep nature of this structure, sonographic guidance is recommended to prevent injury to neurovascular structures and confirm accuracy of injection. Furthermore, this patient has failed conservative treatment with physical therapy and modalities and the diagnostic and therapeutic accuracy is important. Assessment/ Plan:   Diagnoses and all orders for this visit:    1.  Trigger ring finger of right hand  -     triamcinolone acetonide (KENALOG-40) 40 mg/mL injection 40 mg  -     ME INJECT TENDON SHEATH/LIGAMENT  -     AMB POC US, SONO GUIDE NEEDLE         Pathophysiology, recovery and rehabilitation process discussed and questions answered   Counseling for 30 Minutes of the total visit duration   Pictures and figures used as necessary   Provided reassurance   Monitor response to injection   Discussed steroid side effects of fat atrophy, hypopigmentation, steroid flare or infection   Recommend  lower impact activities-walking, Eliptical, Nordic Track, cycling or swimming              1) Remember to stay active and/or exercise regularly (I suggest 30-45 minutes daily)   2) For reliable dietary information, go to www. EATRIGHT.org. You may wish to consider seeing the nutritionist at [facility] [phone], also consider the [address] I have discussed the diagnosis with the patient and the intended plan as seen in the above orders. The patient has received an after-visit summary and questions were answered concerning future plans. Medication Side Effects and Warnings were discussed with patient,  Patient Labs were reviewed and or requested, and  Patient Past Records were reviewed and or requested  yes      Pt agrees to call or return to clinic and/or go to closest ER with any worsening of symptoms. This may include, but not limited to increased fever (>100.4) with NSAIDS or Tylenol, increased edema, confusion, rash, worsening of presenting symptoms. Please note that this dictation was completed with [employer], the computer voice recognition software. Quite often unanticipated grammatical, syntax, homophones, and other interpretive errors are inadvertently transcribed by the computer software. Please disregard these errors. Please excuse any errors that have escaped final proofreading. Thank you.

## 2022-09-08 ENCOUNTER — OFFICE VISIT (OUTPATIENT)
Dept: INTERNAL MEDICINE CLINIC | Age: 57
End: 2022-09-08
Payer: COMMERCIAL

## 2022-09-08 VITALS
HEIGHT: 72 IN | BODY MASS INDEX: 26.95 KG/M2 | TEMPERATURE: 98 F | WEIGHT: 199 LBS | DIASTOLIC BLOOD PRESSURE: 101 MMHG | RESPIRATION RATE: 16 BRPM | SYSTOLIC BLOOD PRESSURE: 157 MMHG | HEART RATE: 69 BPM | OXYGEN SATURATION: 97 %

## 2022-09-08 DIAGNOSIS — E78.5 HYPERLIPIDEMIA, UNSPECIFIED HYPERLIPIDEMIA TYPE: ICD-10-CM

## 2022-09-08 DIAGNOSIS — Z00.00 VISIT FOR WELL MAN HEALTH CHECK: Primary | ICD-10-CM

## 2022-09-08 DIAGNOSIS — M65.322 TRIGGER INDEX FINGER OF LEFT HAND: ICD-10-CM

## 2022-09-08 DIAGNOSIS — M65.342 TRIGGER RING FINGER OF LEFT HAND: ICD-10-CM

## 2022-09-08 DIAGNOSIS — E11.8 TYPE 2 DIABETES MELLITUS WITH COMPLICATION, WITHOUT LONG-TERM CURRENT USE OF INSULIN (HCC): ICD-10-CM

## 2022-09-08 DIAGNOSIS — I10 ESSENTIAL HYPERTENSION: ICD-10-CM

## 2022-09-08 DIAGNOSIS — Z12.11 COLON CANCER SCREENING: ICD-10-CM

## 2022-09-08 PROCEDURE — 20550 NJX 1 TENDON SHEATH/LIGAMENT: CPT | Performed by: FAMILY MEDICINE

## 2022-09-08 PROCEDURE — 99396 PREV VISIT EST AGE 40-64: CPT | Performed by: FAMILY MEDICINE

## 2022-09-08 PROCEDURE — 3051F HG A1C>EQUAL 7.0%<8.0%: CPT | Performed by: FAMILY MEDICINE

## 2022-09-08 RX ORDER — ATORVASTATIN CALCIUM 20 MG/1
TABLET, FILM COATED ORAL
Qty: 90 TABLET | Refills: 4 | Status: SHIPPED | OUTPATIENT
Start: 2022-09-08

## 2022-09-08 RX ORDER — LISINOPRIL 40 MG/1
40 TABLET ORAL DAILY
Qty: 90 TABLET | Refills: 4 | Status: SHIPPED | OUTPATIENT
Start: 2022-09-08

## 2022-09-08 RX ORDER — METFORMIN HYDROCHLORIDE 500 MG/1
500 TABLET ORAL 2 TIMES DAILY WITH MEALS
Qty: 180 TABLET | Refills: 3 | Status: SHIPPED | OUTPATIENT
Start: 2022-09-08

## 2022-09-08 RX ORDER — TRIAMCINOLONE ACETONIDE 40 MG/ML
40 INJECTION, SUSPENSION INTRA-ARTICULAR; INTRAMUSCULAR ONCE
Status: COMPLETED | OUTPATIENT
Start: 2022-09-08 | End: 2022-09-08

## 2022-09-08 RX ADMIN — TRIAMCINOLONE ACETONIDE 40 MG: 40 INJECTION, SUSPENSION INTRA-ARTICULAR; INTRAMUSCULAR at 17:12

## 2022-09-08 NOTE — PROGRESS NOTES
Chief Complaint   Patient presents with    Hand Pain     Patient is here for left hand pain      he is a 56y.o. year old male who presents for CPE. Complete Physical Exam Questions:    1. Do you follow a low fat diet?  no  2. Are you up to date on your Tdap (<10 years)? Yes  3. Have you ever had a Pneumovax vaccine (>65)? No   PCV13 No   PPSV23 No  4. Have you had Zoster vaccine (>60)? No  5. Have you had the HPV - Gardasil (13- 26)? No  6. Do you follow an exercise program?  yes  7. Do you smoke?  no If > 65 and smoker, have you had a abdominal aortic aneurysm ultrasound screen? No  8. Do you consider yourself overweight?  no  9. Is there a family history of CAD< age 48? No  10. Is there a family history of Cancer? No  11. Do you know your Cancer risks? No  12. Have you had a colonoscopy? No  13. Have you been tested for HIV or other STI's? No HIV today(18-64 y/o)? No   14. Have you had an EKG in the last five years(>50)? No  15. Have you had a PSA test done this year (50-69)? No        Reviewed and agree with Nurse Note and duplicated in this note. Reviewed PmHx, RxHx, FmHx, SocHx, AllgHx and updated and dated in the chart.     Family History   Problem Relation Age of Onset    Hypertension Mother     Hypertension Father     Stroke Father     Diabetes Father     Elevated Lipids Father        Past Medical History:   Diagnosis Date    Diabetes ([address]     H/O colonoscopy with polypectomy 08/31/2017    Dr Mylene Vences    High cholesterol     Hypertension       Social History     Socioeconomic History    Marital status: SINGLE   Tobacco Use    Smoking status: Every Day    Smokeless tobacco: Never   Vaping Use    Vaping Use: Never used   Substance and Sexual Activity    Alcohol use: No   Social History Narrative    Family from [facility]        Review of Systems - negative except as listed above      Objective:     Vitals:    09/08/22 1614   BP: (!) 157/101   Pulse: 69   Resp: 16   Temp: 98 °F (36.7 °C)   SpO2: 97%   Weight: 199 lb (90.3 kg)   Height: 6' (1.829 m)       Physical Examination: General appearance - alert, well appearing, and in no distress  Eyes - pupils equal and reactive, extraocular eye movements intact  Ears - bilateral TM's and external ear canals normal  Nose - normal and patent, no erythema, discharge or polyps  Mouth - mucous membranes moist, pharynx normal without lesions  Neck - supple, no significant adenopathy  Chest - clear to auscultation, no wheezes, rales or rhonchi, symmetric air entry  Heart - normal rate, regular rhythm, normal S1, S2, no murmurs, rubs, clicks or gallops  Abdomen - soft, nontender, nondistended, no masses or organomegaly  Musculoskeletal - no joint tenderness, deformity or swelling  Extremities - peripheral pulses normal, no pedal edema, no clubbing or cyanosis  Skin - normal coloration and turgor, no rashes, no suspicious skin lesions noted       Assessment/ Plan:   Diagnoses and all orders for this visit:    1. Visit for well man health check  -     CBC W/O DIFF; Future  -     LIPID PANEL; Future  -     METABOLIC PANEL, COMPREHENSIVE; Future  -     PSA W/ REFLX FREE PSA; Future    2. Type 2 diabetes mellitus with complication, without long-term current use of insulin (HCC)  -     MICROALBUMIN, UR, RAND W/ MICROALB/CREAT RATIO; Future  -     HEMOGLOBIN A1C WITH EAG; Future  -      DIABETES FOOT EXAM    3. Essential hypertension    4. Hyperlipidemia, unspecified hyperlipidemia type           Labs to be drawn: CBC, CMP, Lipid            I have discussed the diagnosis with the patient and the intended plan as seen in the above orders. The patient has received an after-visit summary and questions were answered concerning future plans.        Medication Side Effects and Warnings were discussed with patient,  Patient Labs were reviewed and or requested, and  Patient Past Records were reviewed and or requested  yes         Pt agrees to call or return to clinic and/or go to closest ER with any worsening of symptoms. This may include, but not limited to increased fever (>100.4) with NSAIDS or Tylenol, increased edema, confusion, rash, worsening of presenting symptoms. Please note that this dictation was completed with [employer], the computer voice recognition software. Quite often unanticipated grammatical, syntax, homophones, and other interpretive errors are inadvertently transcribed by the computer software. Please disregard these errors. Please excuse any errors that have escaped final proofreading. Thank you. Chief Complaint   Patient presents with    Hand Pain     Patient is here for left hand pain      he is a 56y.o. year old male who presents for evaluation of left  hand   Pain Assessment Encounter      Seaton Augustina  9/8/2022  Onset of Symptoms: Patient states he has had pain for weeks   ________________________________________________________________________  Description:Patient states no injures. Patient also states he can't bend or straighten hand. Frequency: 5 times a day  Pain Scale:(1-10): 6  Trauma Hx: none   Hx of similar symptoms: Yes  Radiation: NO, hand   Duration:  continuous      Progression: has worsened  What makes it better?: walking  What makes it worse?:Patient states ne hasn't tried anything   Medications tried: none    Patient also states that he has been out of insurance and is about to run out of insurance again in 2 weeks. States that for the past greater than 6 months he has not been taking medications. States that he is to restart his insurance about 90 days and will return to clinic at that point. Patient is open to getting blood work done today. Denies any chest pain shortness of breath daily. He is not checking his blood pressure at home    Reviewed and agree with Nurse Note and duplicated in this note. Reviewed PmHx, RxHx, FmHx, SocHx, AllgHx and updated and dated in the chart.     Family History   Problem Relation Age of Onset    Hypertension Mother     Hypertension Father     Stroke Father     Diabetes Father     Elevated Lipids Father        Past Medical History:   Diagnosis Date    Diabetes ([address]     H/O colonoscopy with polypectomy 08/31/2017    Dr Vinnie Roy    High cholesterol     Hypertension       Social History     Socioeconomic History    Marital status: SINGLE   Tobacco Use    Smoking status: Every Day    Smokeless tobacco: Never   Vaping Use    Vaping Use: Never used   Substance and Sexual Activity    Alcohol use: No   Social History Narrative    Family from [facility]        Review of Systems - negative except as listed above      Objective:     Vitals:    09/08/22 1614   BP: (!) 157/101   Pulse: 69   Resp: 16   Temp: 98 °F (36.7 °C)   SpO2: 97%   Weight: 199 lb (90.3 kg)   Height: 6' (1.829 m)       Physical Examination: General appearance - alert, well appearing, and in no distress  Eyes - pupils equal and reactive, extraocular eye movements intact  Ears - bilateral TM's and external ear canals normal  Nose - normal and patent, no erythema, discharge or polyps  Mouth - mucous membranes moist, pharynx normal without lesions  Neck - supple, no significant adenopathy  Chest - clear to auscultation, no wheezes, rales or rhonchi, symmetric air entry  Heart - normal rate, regular rhythm, normal S1, S2, no murmurs, rubs, clicks or gallops  Abdomen - soft, nontender, nondistended, no masses or organomegaly   Time Out taken at:  4:41 PM  9/8/2022    * Patient was identified by name and date of birth   * Agreement on procedure being performed was verified  * Risks and Benefits explained to the patient  * Procedure site verified and marked as necessary  * Patient was positioned for comfort  * Consent was signed and verified   In the presence of: Witness: genni  Injection #: 1  Needle:  22 gauge  Procedure:                      This procedure was discussed with Kina Curtis and other therapeutic options were considered (risks vs benefits). [facility] and I thought that an injection was merited. After informed consent was obtained, landmarks were identified(marked), and the left ring and index finger flexor tendons 1 A-1 C pulley was cleansed with ChlorPrep in the standard sterile manner. 0.5 mL  1% lidocaine  and  0.5 mL Kenalog  was then injected and needle tenotomy was not performed. Procedure performed with ultrasound needle guidance. The needle was then withdrawn. T he procedure was well tolerated. The patient is asked to continue to rest the area for a few more days before resuming regular activities. It may be more painful for the first 1-2 days. NSAIDS are to be avoided. Watch for fever, or increased swelling or persistent pain in the joint. Call or return to clinic prn if such symptoms occur or there is failure to improve as anticipated. The procedure did provide relief of symptoms in the clinic. RTC in 4 weeks for reevaluation and possible reinjection. Given the patient's body habitus and the anatomically deep nature of this structure, sonographic guidance is recommended to prevent injury to neurovascular structures and confirm accuracy of injection. Furthermore, this patient has failed conservative treatment with physical therapy and modalities and the diagnostic and therapeutic accuracy is important. Assessment/ Plan:   Diagnoses and all orders for this visit:    1. Visit for well man health check  -     CBC W/O DIFF; Future  -     LIPID PANEL; Future  -     METABOLIC PANEL, COMPREHENSIVE; Future  -     PSA W/ REFLX FREE PSA; Future    2. Type 2 diabetes mellitus with complication, without long-term current use of insulin ([facility])  -     MICROALBUMIN, UR, RAND W/ MICROALB/CREAT RATIO; Future  -     HEMOGLOBIN A1C WITH EAG; Future  -      DIABETES FOOT EXAM  -     atorvastatin (LIPITOR) 20 mg tablet; take 1 tablet by mouth once daily  -     lisinopriL (PRINIVIL, ZESTRIL) 40 mg tablet;  Take 1 Tablet by mouth daily. Overdue for visit. Make appointment in 1-2 weeks. -     metFORMIN (GLUCOPHAGE) 500 mg tablet; Take 1 Tablet by mouth two (2) times daily (with meals). 3. Essential hypertension  -     lisinopriL (PRINIVIL, ZESTRIL) 40 mg tablet; Take 1 Tablet by mouth daily. Overdue for visit. Make appointment in 1-2 weeks. 4. Hyperlipidemia, unspecified hyperlipidemia type  -     atorvastatin (LIPITOR) 20 mg tablet; take 1 tablet by mouth once daily    5. Trigger index finger of left hand  -     triamcinolone acetonide (KENALOG-40) 40 mg/mL injection 40 mg  -     AL INJECT TENDON SHEATH/LIGAMENT  -     AMB POC US, SONO GUIDE NEEDLE    6. Trigger ring finger of left hand  -     triamcinolone acetonide (KENALOG-40) 40 mg/mL injection 40 mg  -     AL INJECT TENDON SHEATH/LIGAMENT  -     AMB POC US, SONO GUIDE NEEDLE       Pathophysiology, recovery and rehabilitation process discussed and questions answered   Counseling for 30 Minutes of the total visit duration   Pictures and figures used as necessary   Provided reassurance   Discussed steroid side effects of fat atrophy, hypopigmentation, steroid flare or infection   Monitor response to home exercises   Recommend activity modification   Recommend  lower impact activities-walking, Eliptical, Nordic Track, cycling or swimming              1) Remember to stay active and/or exercise regularly (I suggest 30-45 minutes daily)   2) For reliable dietary information, go to www. EATRIGHT.org. You may wish to consider seeing the nutritionist at [facility] [phone], also consider the [address] [facility]. I have discussed the diagnosis with the patient and the intended plan as seen in the above orders. The patient has received an after-visit summary and questions were answered concerning future plans.      Medication Side Effects and Warnings were discussed with patient,  Patient Labs were reviewed and or requested, and  Patient Past Records were reviewed and or requested  yes      Pt agrees to call or return to clinic and/or go to closest ER with any worsening of symptoms. This may include, but not limited to increased fever (>100.4) with NSAIDS or Tylenol, increased edema, confusion, rash, worsening of presenting symptoms. Please note that this dictation was completed with [place], the computer voice recognition software. Quite often unanticipated grammatical, syntax, homophones, and other interpretive errors are inadvertently transcribed by the computer software. Please disregard these errors. Please excuse any errors that have escaped final proofreading. Thank you.

## 2022-09-10 LAB
ALBUMIN SERPL-MCNC: 4.5 G/DL (ref 3.8–4.9)
ALBUMIN/CREAT UR: 27 MG/G CREAT (ref 0–29)
ALBUMIN/GLOB SERPL: 1.9 {RATIO} (ref 1.2–2.2)
ALP SERPL-CCNC: 65 IU/L (ref 44–121)
ALT SERPL-CCNC: 12 IU/L (ref 0–44)
AST SERPL-CCNC: 12 IU/L (ref 0–40)
BILIRUB SERPL-MCNC: 0.5 MG/DL (ref 0–1.2)
BUN SERPL-MCNC: 6 MG/DL (ref 6–24)
BUN/CREAT SERPL: 7 (ref 9–20)
CALCIUM SERPL-MCNC: 9.6 MG/DL (ref 8.7–10.2)
CHLORIDE SERPL-SCNC: 101 MMOL/L (ref 96–106)
CHOLEST SERPL-MCNC: 235 MG/DL (ref 100–199)
CO2 SERPL-SCNC: 22 MMOL/L (ref 20–29)
CREAT SERPL-MCNC: 0.81 MG/DL (ref 0.76–1.27)
CREAT UR-MCNC: 91.8 MG/DL
EGFR: 104 ML/MIN/1.73
ERYTHROCYTE [DISTWIDTH] IN BLOOD BY AUTOMATED COUNT: 12.8 % (ref 11.6–15.4)
EST. AVERAGE GLUCOSE BLD GHB EST-MCNC: 160 MG/DL
GLOBULIN SER CALC-MCNC: 2.4 G/DL (ref 1.5–4.5)
GLUCOSE SERPL-MCNC: 163 MG/DL (ref 65–99)
HBA1C MFR BLD: 7.2 % (ref 4.8–5.6)
HCT VFR BLD AUTO: 43.6 % (ref 37.5–51)
HDLC SERPL-MCNC: 76 MG/DL
HGB BLD-MCNC: 15.3 G/DL (ref 13–17.7)
LDLC SERPL CALC-MCNC: 142 MG/DL (ref 0–99)
MCH RBC QN AUTO: 30.9 PG (ref 26.6–33)
MCHC RBC AUTO-ENTMCNC: 35.1 G/DL (ref 31.5–35.7)
MCV RBC AUTO: 88 FL (ref 79–97)
MICROALBUMIN UR-MCNC: 25.2 UG/ML
PLATELET # BLD AUTO: 230 X10E3/UL (ref 150–450)
POTASSIUM SERPL-SCNC: 3.8 MMOL/L (ref 3.5–5.2)
PROT SERPL-MCNC: 6.9 G/DL (ref 6–8.5)
PSA SERPL-MCNC: 1.1 NG/ML (ref 0–4)
RBC # BLD AUTO: 4.95 X10E6/UL (ref 4.14–5.8)
REFLEX CRITERIA: NORMAL
SODIUM SERPL-SCNC: 136 MMOL/L (ref 134–144)
TRIGL SERPL-MCNC: 100 MG/DL (ref 0–149)
VLDLC SERPL CALC-MCNC: 17 MG/DL (ref 5–40)
WBC # BLD AUTO: 7.2 X10E3/UL (ref 3.4–10.8)

## 2022-12-23 DIAGNOSIS — E78.5 HYPERLIPIDEMIA, UNSPECIFIED HYPERLIPIDEMIA TYPE: ICD-10-CM

## 2022-12-23 DIAGNOSIS — E11.8 TYPE 2 DIABETES MELLITUS WITH COMPLICATION, WITHOUT LONG-TERM CURRENT USE OF INSULIN (HCC): ICD-10-CM

## 2022-12-23 DIAGNOSIS — I10 ESSENTIAL HYPERTENSION: ICD-10-CM

## 2022-12-27 DIAGNOSIS — I10 ESSENTIAL HYPERTENSION: ICD-10-CM

## 2022-12-27 DIAGNOSIS — E78.5 HYPERLIPIDEMIA, UNSPECIFIED HYPERLIPIDEMIA TYPE: ICD-10-CM

## 2022-12-27 DIAGNOSIS — E11.8 TYPE 2 DIABETES MELLITUS WITH COMPLICATION, WITHOUT LONG-TERM CURRENT USE OF INSULIN (HCC): ICD-10-CM

## 2022-12-27 RX ORDER — ATORVASTATIN CALCIUM 20 MG/1
TABLET, FILM COATED ORAL
Qty: 90 TABLET | Refills: 4 | Status: SHIPPED | OUTPATIENT
Start: 2022-12-27

## 2022-12-27 RX ORDER — LISINOPRIL 40 MG/1
40 TABLET ORAL DAILY
Qty: 90 TABLET | Refills: 4 | Status: SHIPPED | OUTPATIENT
Start: 2022-12-27

## 2022-12-27 RX ORDER — ATORVASTATIN CALCIUM 20 MG/1
TABLET, FILM COATED ORAL
Qty: 90 TABLET | Refills: 4 | Status: SHIPPED | OUTPATIENT
Start: 2022-12-27 | End: 2022-12-27 | Stop reason: SDUPTHER

## 2022-12-27 RX ORDER — METFORMIN HYDROCHLORIDE 500 MG/1
500 TABLET ORAL 2 TIMES DAILY WITH MEALS
Qty: 180 TABLET | Refills: 3 | Status: SHIPPED | OUTPATIENT
Start: 2022-12-27 | End: 2022-12-27 | Stop reason: SDUPTHER

## 2022-12-27 RX ORDER — LISINOPRIL 40 MG/1
40 TABLET ORAL DAILY
Qty: 90 TABLET | Refills: 4 | Status: SHIPPED | OUTPATIENT
Start: 2022-12-27 | End: 2022-12-27 | Stop reason: SDUPTHER

## 2022-12-27 RX ORDER — METFORMIN HYDROCHLORIDE 500 MG/1
500 TABLET ORAL 2 TIMES DAILY WITH MEALS
Qty: 180 TABLET | Refills: 3 | Status: SHIPPED | OUTPATIENT
Start: 2022-12-27

## 2024-01-25 ENCOUNTER — OFFICE VISIT (OUTPATIENT)
Facility: CLINIC | Age: 59
End: 2024-01-25
Payer: COMMERCIAL

## 2024-01-25 VITALS
HEART RATE: 84 BPM | RESPIRATION RATE: 16 BRPM | SYSTOLIC BLOOD PRESSURE: 178 MMHG | HEIGHT: 72 IN | TEMPERATURE: 98.3 F | WEIGHT: 210 LBS | OXYGEN SATURATION: 95 % | BODY MASS INDEX: 28.44 KG/M2 | DIASTOLIC BLOOD PRESSURE: 103 MMHG

## 2024-01-25 DIAGNOSIS — Z12.12 SCREENING FOR COLORECTAL CANCER: ICD-10-CM

## 2024-01-25 DIAGNOSIS — M25.571 ACUTE RIGHT ANKLE PAIN: ICD-10-CM

## 2024-01-25 DIAGNOSIS — E11.8 TYPE 2 DIABETES MELLITUS WITH UNSPECIFIED COMPLICATIONS (HCC): ICD-10-CM

## 2024-01-25 DIAGNOSIS — Z12.11 SCREENING FOR COLORECTAL CANCER: ICD-10-CM

## 2024-01-25 DIAGNOSIS — E78.5 HYPERLIPIDEMIA, UNSPECIFIED HYPERLIPIDEMIA TYPE: ICD-10-CM

## 2024-01-25 DIAGNOSIS — Z00.00 VISIT FOR WELL MAN HEALTH CHECK: Primary | ICD-10-CM

## 2024-01-25 DIAGNOSIS — I10 ESSENTIAL (PRIMARY) HYPERTENSION: ICD-10-CM

## 2024-01-25 DIAGNOSIS — S89.92XA INJURY OF LEFT KNEE, INITIAL ENCOUNTER: ICD-10-CM

## 2024-01-25 LAB
ALBUMIN SERPL-MCNC: 4.1 G/DL (ref 3.5–5)
ALBUMIN/GLOB SERPL: 1.2 (ref 1.1–2.2)
ALP SERPL-CCNC: 101 U/L (ref 45–117)
ALT SERPL-CCNC: 26 U/L (ref 12–78)
ANION GAP SERPL CALC-SCNC: 5 MMOL/L (ref 5–15)
AST SERPL-CCNC: 12 U/L (ref 15–37)
BASOPHILS # BLD: 0 K/UL (ref 0–0.1)
BASOPHILS NFR BLD: 0 % (ref 0–1)
BILIRUB SERPL-MCNC: 0.4 MG/DL (ref 0.2–1)
BUN SERPL-MCNC: 13 MG/DL (ref 6–20)
BUN/CREAT SERPL: 13 (ref 12–20)
CALCIUM SERPL-MCNC: 9.4 MG/DL (ref 8.5–10.1)
CHLORIDE SERPL-SCNC: 105 MMOL/L (ref 97–108)
CHOLEST SERPL-MCNC: 210 MG/DL
CO2 SERPL-SCNC: 29 MMOL/L (ref 21–32)
CREAT SERPL-MCNC: 1.04 MG/DL (ref 0.7–1.3)
CREAT UR-MCNC: 162 MG/DL
DIFFERENTIAL METHOD BLD: NORMAL
EOSINOPHIL # BLD: 0.2 K/UL (ref 0–0.4)
EOSINOPHIL NFR BLD: 3 % (ref 0–7)
ERYTHROCYTE [DISTWIDTH] IN BLOOD BY AUTOMATED COUNT: 13.1 % (ref 11.5–14.5)
GLOBULIN SER CALC-MCNC: 3.5 G/DL (ref 2–4)
GLUCOSE SERPL-MCNC: 217 MG/DL (ref 65–100)
HCT VFR BLD AUTO: 45.6 % (ref 36.6–50.3)
HDLC SERPL-MCNC: 71 MG/DL
HDLC SERPL: 3 (ref 0–5)
HGB BLD-MCNC: 15.3 G/DL (ref 12.1–17)
HIV 1+2 AB+HIV1 P24 AG SERPL QL IA: NONREACTIVE
HIV 1/2 RESULT COMMENT: NORMAL
IMM GRANULOCYTES # BLD AUTO: 0 K/UL (ref 0–0.04)
IMM GRANULOCYTES NFR BLD AUTO: 0 % (ref 0–0.5)
LDLC SERPL CALC-MCNC: 108.6 MG/DL (ref 0–100)
LYMPHOCYTES # BLD: 3 K/UL (ref 0.8–3.5)
LYMPHOCYTES NFR BLD: 36 % (ref 12–49)
MCH RBC QN AUTO: 29.9 PG (ref 26–34)
MCHC RBC AUTO-ENTMCNC: 33.6 G/DL (ref 30–36.5)
MCV RBC AUTO: 89.2 FL (ref 80–99)
MICROALBUMIN UR-MCNC: 12.5 MG/DL
MICROALBUMIN/CREAT UR-RTO: 77 MG/G (ref 0–30)
MONOCYTES # BLD: 0.6 K/UL (ref 0–1)
MONOCYTES NFR BLD: 8 % (ref 5–13)
NEUTS SEG # BLD: 4.4 K/UL (ref 1.8–8)
NEUTS SEG NFR BLD: 53 % (ref 32–75)
NRBC # BLD: 0 K/UL (ref 0–0.01)
NRBC BLD-RTO: 0 PER 100 WBC
PLATELET # BLD AUTO: 253 K/UL (ref 150–400)
PMV BLD AUTO: 10.3 FL (ref 8.9–12.9)
POTASSIUM SERPL-SCNC: 4.2 MMOL/L (ref 3.5–5.1)
PROT SERPL-MCNC: 7.6 G/DL (ref 6.4–8.2)
PSA SERPL-MCNC: 1.2 NG/ML (ref 0.01–4)
RBC # BLD AUTO: 5.11 M/UL (ref 4.1–5.7)
SODIUM SERPL-SCNC: 139 MMOL/L (ref 136–145)
TRIGL SERPL-MCNC: 152 MG/DL
VLDLC SERPL CALC-MCNC: 30.4 MG/DL
WBC # BLD AUTO: 8.3 K/UL (ref 4.1–11.1)

## 2024-01-25 PROCEDURE — 99396 PREV VISIT EST AGE 40-64: CPT | Performed by: FAMILY MEDICINE

## 2024-01-25 PROCEDURE — 3077F SYST BP >= 140 MM HG: CPT | Performed by: FAMILY MEDICINE

## 2024-01-25 PROCEDURE — 3080F DIAST BP >= 90 MM HG: CPT | Performed by: FAMILY MEDICINE

## 2024-01-25 PROCEDURE — 99214 OFFICE O/P EST MOD 30 MIN: CPT | Performed by: FAMILY MEDICINE

## 2024-01-25 RX ORDER — ATORVASTATIN CALCIUM 20 MG/1
20 TABLET, FILM COATED ORAL DAILY
Qty: 90 TABLET | Refills: 1 | Status: SHIPPED | OUTPATIENT
Start: 2024-01-25

## 2024-01-25 RX ORDER — LISINOPRIL 40 MG/1
40 TABLET ORAL DAILY
Qty: 90 TABLET | Refills: 1 | Status: SHIPPED | OUTPATIENT
Start: 2024-01-25

## 2024-01-25 SDOH — ECONOMIC STABILITY: FOOD INSECURITY: WITHIN THE PAST 12 MONTHS, THE FOOD YOU BOUGHT JUST DIDN'T LAST AND YOU DIDN'T HAVE MONEY TO GET MORE.: NEVER TRUE

## 2024-01-25 SDOH — ECONOMIC STABILITY: FOOD INSECURITY: WITHIN THE PAST 12 MONTHS, YOU WORRIED THAT YOUR FOOD WOULD RUN OUT BEFORE YOU GOT MONEY TO BUY MORE.: NEVER TRUE

## 2024-01-25 SDOH — ECONOMIC STABILITY: INCOME INSECURITY: HOW HARD IS IT FOR YOU TO PAY FOR THE VERY BASICS LIKE FOOD, HOUSING, MEDICAL CARE, AND HEATING?: NOT HARD AT ALL

## 2024-01-25 SDOH — ECONOMIC STABILITY: HOUSING INSECURITY
IN THE LAST 12 MONTHS, WAS THERE A TIME WHEN YOU DID NOT HAVE A STEADY PLACE TO SLEEP OR SLEPT IN A SHELTER (INCLUDING NOW)?: NO

## 2024-01-25 ASSESSMENT — PATIENT HEALTH QUESTIONNAIRE - PHQ9
1. LITTLE INTEREST OR PLEASURE IN DOING THINGS: 0
SUM OF ALL RESPONSES TO PHQ QUESTIONS 1-9: 0
SUM OF ALL RESPONSES TO PHQ9 QUESTIONS 1 & 2: 0
SUM OF ALL RESPONSES TO PHQ QUESTIONS 1-9: 0
2. FEELING DOWN, DEPRESSED OR HOPELESS: 0

## 2024-01-25 NOTE — PROGRESS NOTES
cancer  -     Cologuard (Fecal DNA Colorectal Cancer Screening)        Return in about 1 week (around 2/1/2024) for HTN.  Pathophysiology, recovery and rehabilitation process discussed and questions answered   Counseling for 30 Minutes of the total visit duration   Pictures and figures used as necessary   Provided reassurance   Monitor response to Physical Therapy   Recommend activity modification   Recommend  lower impact activities-walking, Eliptical, Nordic Track, cycling or swimming              1) Remember to stay active and/or exercise regularly (I suggest 30-45 minutes daily)   2) For reliable dietary information, go to www.EATRIGHT.org. You may wish to consider seeing the nutritionist at [facility] [phone], also consider the Mediterranean diet.      I have discussed the diagnosis with the patient and the intended plan as seen in the above orders.  The patient has received an after-visit summary and questions were answered concerning future plans.     Medication Side Effects and Warnings were discussed with patient,  Patient Labs were reviewed and or requested, and  Patient Past Records were reviewed and or requested  yes      Pt agrees to call or return to clinic and/or go to closest ER with any worsening of symptoms.  This may include, but not limited to increased fever (>100.4) with NSAIDS or Tylenol, increased edema, confusion, rash, worsening of presenting symptoms.    Please note that this dictation was completed with [employer], the computer voice recognition software.  Quite often unanticipated grammatical, syntax, homophones, and other interpretive errors are inadvertently transcribed by the computer software.  Please disregard these errors.  Please excuse any errors that have escaped final proofreading.  Thank you.

## 2024-01-26 LAB
EST. AVERAGE GLUCOSE BLD GHB EST-MCNC: 194 MG/DL
HBA1C MFR BLD: 8.4 % (ref 4–5.6)

## 2024-02-06 LAB — NONINV COLON CA DNA+OCC BLD SCRN STL QL: NORMAL

## 2024-04-16 DIAGNOSIS — E78.5 HYPERLIPIDEMIA, UNSPECIFIED HYPERLIPIDEMIA TYPE: ICD-10-CM

## 2024-04-16 RX ORDER — ATORVASTATIN CALCIUM 20 MG/1
20 TABLET, FILM COATED ORAL DAILY
Qty: 90 TABLET | Refills: 1 | Status: SHIPPED | OUTPATIENT
Start: 2024-04-16

## 2024-05-07 ENCOUNTER — OFFICE VISIT (OUTPATIENT)
Facility: CLINIC | Age: 59
End: 2024-05-07
Payer: COMMERCIAL

## 2024-05-07 ENCOUNTER — TELEPHONE (OUTPATIENT)
Facility: CLINIC | Age: 59
End: 2024-05-07

## 2024-05-07 VITALS
BODY MASS INDEX: 27.63 KG/M2 | DIASTOLIC BLOOD PRESSURE: 93 MMHG | RESPIRATION RATE: 16 BRPM | HEIGHT: 72 IN | WEIGHT: 204 LBS | TEMPERATURE: 98.3 F | HEART RATE: 77 BPM | SYSTOLIC BLOOD PRESSURE: 153 MMHG | OXYGEN SATURATION: 96 %

## 2024-05-07 DIAGNOSIS — M79.605 BILATERAL LEG PAIN: ICD-10-CM

## 2024-05-07 DIAGNOSIS — I10 ESSENTIAL (PRIMARY) HYPERTENSION: ICD-10-CM

## 2024-05-07 DIAGNOSIS — M25.551 RIGHT HIP PAIN: ICD-10-CM

## 2024-05-07 DIAGNOSIS — E11.8 TYPE 2 DIABETES MELLITUS WITH UNSPECIFIED COMPLICATIONS (HCC): ICD-10-CM

## 2024-05-07 DIAGNOSIS — M79.604 BILATERAL LEG PAIN: ICD-10-CM

## 2024-05-07 DIAGNOSIS — M79.604 BILATERAL LEG PAIN: Primary | ICD-10-CM

## 2024-05-07 DIAGNOSIS — E11.8 TYPE 2 DIABETES MELLITUS WITH COMPLICATION (HCC): ICD-10-CM

## 2024-05-07 DIAGNOSIS — M79.605 BILATERAL LEG PAIN: Primary | ICD-10-CM

## 2024-05-07 DIAGNOSIS — E78.5 HYPERLIPIDEMIA, UNSPECIFIED HYPERLIPIDEMIA TYPE: ICD-10-CM

## 2024-05-07 PROCEDURE — 3080F DIAST BP >= 90 MM HG: CPT | Performed by: FAMILY MEDICINE

## 2024-05-07 PROCEDURE — 99214 OFFICE O/P EST MOD 30 MIN: CPT | Performed by: FAMILY MEDICINE

## 2024-05-07 PROCEDURE — 3052F HG A1C>EQUAL 8.0%<EQUAL 9.0%: CPT | Performed by: FAMILY MEDICINE

## 2024-05-07 PROCEDURE — 3077F SYST BP >= 140 MM HG: CPT | Performed by: FAMILY MEDICINE

## 2024-05-07 RX ORDER — METHYLPREDNISOLONE 4 MG/1
TABLET ORAL
Qty: 1 KIT | Refills: 0 | Status: SHIPPED | OUTPATIENT
Start: 2024-05-07 | End: 2024-05-13

## 2024-05-07 RX ORDER — METHYLPREDNISOLONE 4 MG/1
TABLET ORAL
Qty: 1 KIT | Refills: 0 | Status: SHIPPED | OUTPATIENT
Start: 2024-05-07 | End: 2024-05-07 | Stop reason: SDUPTHER

## 2024-05-07 RX ORDER — LISINOPRIL 40 MG/1
40 TABLET ORAL DAILY
Qty: 90 TABLET | Refills: 1 | Status: SHIPPED | OUTPATIENT
Start: 2024-05-07

## 2024-05-07 RX ORDER — LISINOPRIL 40 MG/1
40 TABLET ORAL DAILY
Qty: 90 TABLET | Refills: 1 | Status: SHIPPED | OUTPATIENT
Start: 2024-05-07 | End: 2024-05-07 | Stop reason: SDUPTHER

## 2024-05-07 NOTE — TELEPHONE ENCOUNTER
3 medications her sent to the wrong pharmacy they don't take  take his insurance    New rx in the system  [facility]  [phone]     Thank you.

## 2024-05-07 NOTE — PROGRESS NOTES
Chief Complaint   Patient presents with    Hip Pain     Patient is here for hip pain      he is a 58 y.o. year old male who presents for evaluation of both hips  Pain Assessment Encounter      Jean Paul Ross  5/7/2024  Onset of Symptoms: Patient states he has had pain for weeks   ________________________________________________________________________  Description:Patient states no injures    Frequency: 5 times a day  Pain Scale:(1-10): 8  Trauma Hx: none  Hx of similar symptoms: Yes  Radiation: Yes, hip, low back  Duration:  continuous      Progression: has worsened  What makes it better?: OTC meds  What makes it worse?:sitting, strecthing, and walking  Medications tried: acetaminophen  History of Present Illness    The patient has been experiencing hip pain for the past 2 months, characterized by a burning sensation akin to exercise, particularly during ambulation. This discomfort necessitates cessation of walking. He denies any specific incident that could have precipitated this symptom. His occupation as a  involves sitting on a tricycle, a routine he has been adhering to for the past 1.5 years.      Reviewed and agree with Nurse Note and duplicated in this note.  Reviewed PmHx, RxHx, FmHx, SocHx, AllgHx and updated and dated in the chart.    Family History   Problem Relation Age of Onset    Hypertension Mother     Hypertension Father     Elevated Lipids Father     Diabetes Father     Stroke Father        Past Medical History:   Diagnosis Date    Diabetes (HCC)     H/O colonoscopy with polypectomy 08/31/2017    Dr Rip Cuenca    High cholesterol     Hypertension       Social History     Socioeconomic History    Marital status: Single   Tobacco Use    Smoking status: Every Day    Smokeless tobacco: Never   Substance and Sexual Activity    Alcohol use: No   Social History Narrative    Family from [place]     Social Determinants of Health     Financial Resource Strain: Low Risk  (1/25/2024)    Overall

## 2024-05-07 NOTE — PATIENT INSTRUCTIONS
[email or web address] and enter H967 to learn more about \"DASH Diet: Care Instructions.\"  Current as of: September 20, 2023               Content Version: 14.0  © 2712-0282 [employer].   Care instructions adapted under license by [employer]. If you have questions about a medical condition or this instruction, always ask your healthcare professional. [employer] disclaims any warranty or liability for your use of this information.

## 2024-05-08 LAB
ALBUMIN SERPL-MCNC: 4.1 G/DL (ref 3.5–5)
ALBUMIN/GLOB SERPL: 1.2 (ref 1.1–2.2)
ALP SERPL-CCNC: 92 U/L (ref 45–117)
ALT SERPL-CCNC: 32 U/L (ref 12–78)
ANION GAP SERPL CALC-SCNC: 5 MMOL/L (ref 5–15)
AST SERPL-CCNC: 12 U/L (ref 15–37)
BILIRUB SERPL-MCNC: 0.6 MG/DL (ref 0.2–1)
BUN SERPL-MCNC: 10 MG/DL (ref 6–20)
BUN/CREAT SERPL: 11 (ref 12–20)
CALCIUM SERPL-MCNC: 9.7 MG/DL (ref 8.5–10.1)
CHLORIDE SERPL-SCNC: 103 MMOL/L (ref 97–108)
CK SERPL-CCNC: 89 U/L (ref 39–308)
CO2 SERPL-SCNC: 28 MMOL/L (ref 21–32)
COMMENT:: NORMAL
CREAT SERPL-MCNC: 0.88 MG/DL (ref 0.7–1.3)
EST. AVERAGE GLUCOSE BLD GHB EST-MCNC: 200 MG/DL
GLOBULIN SER CALC-MCNC: 3.4 G/DL (ref 2–4)
GLUCOSE SERPL-MCNC: 190 MG/DL (ref 65–100)
HBA1C MFR BLD: 8.6 % (ref 4–5.6)
POTASSIUM SERPL-SCNC: 3.9 MMOL/L (ref 3.5–5.1)
PROT SERPL-MCNC: 7.5 G/DL (ref 6.4–8.2)
SODIUM SERPL-SCNC: 136 MMOL/L (ref 136–145)
SPECIMEN HOLD: NORMAL

## 2024-05-28 ENCOUNTER — OFFICE VISIT (OUTPATIENT)
Facility: CLINIC | Age: 59
End: 2024-05-28
Payer: COMMERCIAL

## 2024-05-28 VITALS
WEIGHT: 202 LBS | HEART RATE: 98 BPM | OXYGEN SATURATION: 97 % | DIASTOLIC BLOOD PRESSURE: 89 MMHG | RESPIRATION RATE: 16 BRPM | SYSTOLIC BLOOD PRESSURE: 139 MMHG | TEMPERATURE: 98.2 F | BODY MASS INDEX: 27.36 KG/M2 | HEIGHT: 72 IN

## 2024-05-28 DIAGNOSIS — M79.605 BILATERAL LEG PAIN: Primary | ICD-10-CM

## 2024-05-28 DIAGNOSIS — I10 ESSENTIAL HYPERTENSION: ICD-10-CM

## 2024-05-28 DIAGNOSIS — M79.604 BILATERAL LEG PAIN: Primary | ICD-10-CM

## 2024-05-28 PROCEDURE — 3079F DIAST BP 80-89 MM HG: CPT | Performed by: FAMILY MEDICINE

## 2024-05-28 PROCEDURE — 3075F SYST BP GE 130 - 139MM HG: CPT | Performed by: FAMILY MEDICINE

## 2024-05-28 PROCEDURE — 99214 OFFICE O/P EST MOD 30 MIN: CPT | Performed by: FAMILY MEDICINE

## 2024-05-28 NOTE — PROGRESS NOTES
Chief Complaint   Patient presents with    Blood Pressure Check     Patient is here for blood pressure check.      Pt who presents for follow up of a pre-existing problem of hypertension.    Diet and Lifestyle: not attempting to follow a low fat, low cholesterol diet  Home BP Monitoring: is not measured at home  Use of agents associated with hypertension:  No.  Cardiovascular ROS: taking medications as instructed, no medication side effects noted, no TIA's, no chest pain on exertion, no dyspnea on exertion, no swelling of ankles.     New concerns: none.     Reviewed and agree with Nurse Note and duplicated in this note.  Reviewed PmHx, RxHx, FmHx, SocHx, AllgHx and updated and dated in the chart.    Family History   Problem Relation Age of Onset    Hypertension Mother     Hypertension Father     Elevated Lipids Father     Diabetes Father     Stroke Father        Past Medical History:   Diagnosis Date    Diabetes (HCC)     H/O colonoscopy with polypectomy 08/31/2017    Dr Rip Cuenca    High cholesterol     Hypertension       Social History     Socioeconomic History    Marital status: Single   Tobacco Use    Smoking status: Every Day    Smokeless tobacco: Never   Substance and Sexual Activity    Alcohol use: No   Social History Narrative    Family from [place]     Social Determinants of Health     Financial Resource Strain: Low Risk  (1/25/2024)    Overall Financial Resource Strain (CARDIA)     Difficulty of Paying Living Expenses: Not hard at all   Food Insecurity: No Food Insecurity (1/25/2024)    Hunger Vital Sign     Worried About Running Out of Food in the Last Year: Never true     Ran Out of Food in the Last Year: Never true   Transportation Needs: Unknown (1/25/2024)    PRAPARE - Transportation     Lack of Transportation (Non-Medical): No   Housing Stability: Unknown (1/25/2024)    Housing Stability Vital Sign     Unstable Housing in the Last Year: No        Review of Systems - negative except as

## 2024-05-28 NOTE — PATIENT INSTRUCTIONS
serving is 1 slice of bread, 1 ounce of dry cereal, or 1/2 cup of cooked rice, pasta, or cooked cereal. Try to choose whole-grain products as much as possible.  Limit lean meat, poultry, and fish to 6 ounces or less each day. One egg counts as 1 ounce.  Eat 4 to 5 servings of nuts, seeds, and legumes (cooked dried beans, lentils, and split peas) each week. A serving is 1/3 cup of nuts, 2 tablespoons of seeds, 2 tablespoons of peanut butter, or 1/2 cup of cooked beans or peas.  Limit fats and oils to 2 to 3 servings each day. A serving is 1 teaspoon of vegetable oil or 2 tablespoons of salad dressing.  Limit sweets and added sugars to 5 servings or less a week. A serving is 1 tablespoon jelly or jam, 1/2 cup sorbet, or 1 cup of lemonade.  Eat less than 2,300 milligrams (mg) of sodium a day. If you limit your sodium to 1,500 mg a day, you can lower your blood pressure even more.  Be aware that all of these are the suggested number of servings for people who eat 1,800 to 2,000 calories a day. Your recommended number of servings may be different if you need more or fewer calories.  Tips for success  Start small. Make small changes, and stick with them. Once those changes become habit, add a few more changes.  Try some of the following:  Make it a goal to eat a fruit or vegetable at every meal and at snacks. This will make it easy to get the recommended amount of fruits and vegetables each day.  Try yogurt topped with fruit and nuts for a snack or healthy dessert.  Add lettuce, tomato, cucumber, and onion to sandwiches.  Have a variety of cut-up vegetables with a low-fat dip as an appetizer instead of chips and dip.  Sprinkle sunflower seeds or chopped almonds over salads. Or try adding chopped walnuts or almonds to cooked vegetables.  Try some vegetarian meals using beans and peas. Add garbanzo or kidney beans to salads. Make burritos and tacos with mashed fung beans or black beans.  Where can you learn more?  Go to

## 2024-11-26 ENCOUNTER — OFFICE VISIT (OUTPATIENT)
Facility: CLINIC | Age: 59
End: 2024-11-26

## 2024-11-26 VITALS
WEIGHT: 195 LBS | SYSTOLIC BLOOD PRESSURE: 207 MMHG | RESPIRATION RATE: 16 BRPM | OXYGEN SATURATION: 97 % | HEART RATE: 78 BPM | BODY MASS INDEX: 26.41 KG/M2 | TEMPERATURE: 98.6 F | HEIGHT: 72 IN | DIASTOLIC BLOOD PRESSURE: 102 MMHG

## 2024-11-26 DIAGNOSIS — M65.341 TRIGGER RING FINGER OF RIGHT HAND: ICD-10-CM

## 2024-11-26 DIAGNOSIS — I10 ESSENTIAL (PRIMARY) HYPERTENSION: ICD-10-CM

## 2024-11-26 DIAGNOSIS — E78.5 HYPERLIPIDEMIA, UNSPECIFIED HYPERLIPIDEMIA TYPE: ICD-10-CM

## 2024-11-26 DIAGNOSIS — E11.8 TYPE 2 DIABETES MELLITUS WITH COMPLICATION (HCC): Primary | ICD-10-CM

## 2024-11-26 RX ORDER — LISINOPRIL AND HYDROCHLOROTHIAZIDE 12.5; 2 MG/1; MG/1
2 TABLET ORAL DAILY
Qty: 180 TABLET | Refills: 1 | Status: SHIPPED | OUTPATIENT
Start: 2024-11-26

## 2024-11-26 RX ORDER — TRIAMCINOLONE ACETONIDE 40 MG/ML
40 INJECTION, SUSPENSION INTRA-ARTICULAR; INTRAMUSCULAR ONCE
Status: COMPLETED | OUTPATIENT
Start: 2024-11-26 | End: 2024-11-26

## 2024-11-26 RX ADMIN — TRIAMCINOLONE ACETONIDE 40 MG: 40 INJECTION, SUSPENSION INTRA-ARTICULAR; INTRAMUSCULAR at 12:00

## 2024-11-27 LAB
ALBUMIN SERPL-MCNC: 3.3 G/DL (ref 3.5–5)
ALBUMIN/GLOB SERPL: 0.9 (ref 1.1–2.2)
ALP SERPL-CCNC: 98 U/L (ref 45–117)
ALT SERPL-CCNC: 23 U/L (ref 12–78)
ANION GAP SERPL CALC-SCNC: 5 MMOL/L (ref 2–12)
AST SERPL-CCNC: 10 U/L (ref 15–37)
BILIRUB SERPL-MCNC: 0.5 MG/DL (ref 0.2–1)
BUN SERPL-MCNC: 11 MG/DL (ref 6–20)
BUN/CREAT SERPL: 14 (ref 12–20)
CALCIUM SERPL-MCNC: 8.9 MG/DL (ref 8.5–10.1)
CHLORIDE SERPL-SCNC: 103 MMOL/L (ref 97–108)
CHOLEST SERPL-MCNC: 247 MG/DL
CO2 SERPL-SCNC: 30 MMOL/L (ref 21–32)
CREAT SERPL-MCNC: 0.79 MG/DL (ref 0.7–1.3)
EST. AVERAGE GLUCOSE BLD GHB EST-MCNC: 229 MG/DL
GLOBULIN SER CALC-MCNC: 3.7 G/DL (ref 2–4)
GLUCOSE SERPL-MCNC: 267 MG/DL (ref 65–100)
HBA1C MFR BLD: 9.6 % (ref 4–5.6)
HDLC SERPL-MCNC: 78 MG/DL
HDLC SERPL: 3.2 (ref 0–5)
LDLC SERPL CALC-MCNC: 153 MG/DL (ref 0–100)
POTASSIUM SERPL-SCNC: 4.8 MMOL/L (ref 3.5–5.1)
PROT SERPL-MCNC: 7 G/DL (ref 6.4–8.2)
SODIUM SERPL-SCNC: 138 MMOL/L (ref 136–145)
TRIGL SERPL-MCNC: 80 MG/DL
VLDLC SERPL CALC-MCNC: 16 MG/DL

## 2024-12-01 RX ORDER — PRAVASTATIN SODIUM 40 MG
40 TABLET ORAL DAILY
Qty: 90 TABLET | Refills: 1 | Status: SHIPPED | OUTPATIENT
Start: 2024-12-01